# Patient Record
Sex: MALE | Race: WHITE
[De-identification: names, ages, dates, MRNs, and addresses within clinical notes are randomized per-mention and may not be internally consistent; named-entity substitution may affect disease eponyms.]

---

## 2017-02-27 PROBLEM — H40.1131 PRIMARY OPEN ANGLE GLAUCOMA OF BOTH EYES, MILD STAGE: Status: ACTIVE | Noted: 2017-02-27

## 2017-05-31 ENCOUNTER — PRIOR ORIGINAL RECORDS (OUTPATIENT)
Dept: OTHER | Age: 67
End: 2017-05-31

## 2017-05-31 ENCOUNTER — LAB ENCOUNTER (OUTPATIENT)
Dept: LAB | Facility: HOSPITAL | Age: 67
End: 2017-05-31
Attending: INTERNAL MEDICINE
Payer: MEDICARE

## 2017-05-31 DIAGNOSIS — E78.00 PURE HYPERCHOLESTEROLEMIA: Primary | ICD-10-CM

## 2017-05-31 PROCEDURE — 80061 LIPID PANEL: CPT

## 2017-05-31 PROCEDURE — 82550 ASSAY OF CK (CPK): CPT

## 2017-05-31 PROCEDURE — 84450 TRANSFERASE (AST) (SGOT): CPT

## 2017-05-31 PROCEDURE — 84460 ALANINE AMINO (ALT) (SGPT): CPT

## 2017-05-31 PROCEDURE — 36415 COLL VENOUS BLD VENIPUNCTURE: CPT

## 2017-06-05 RX ORDER — SULFASALAZINE 500 MG/1
TABLET ORAL
Qty: 180 TABLET | Refills: 1 | OUTPATIENT
Start: 2017-06-05

## 2017-06-05 NOTE — TELEPHONE ENCOUNTER
LOV:12-8  Last Filled:12-8, #180 with 1 refill  Labs:5-31, AST 45 and ALT 48  Future Appointments  Date Time Provider Johanny Pittman   8/28/2017 10:00 AM Navya Fay MD [address] [facility]       Please Advise

## 2017-06-08 ENCOUNTER — PRIOR ORIGINAL RECORDS (OUTPATIENT)
Dept: OTHER | Age: 67
End: 2017-06-08

## 2017-06-08 ENCOUNTER — LAB ENCOUNTER (OUTPATIENT)
Dept: LAB | Facility: HOSPITAL | Age: 67
End: 2017-06-08
Attending: INTERNAL MEDICINE
Payer: MEDICARE

## 2017-06-08 DIAGNOSIS — Z51.81 THERAPEUTIC DRUG MONITORING: ICD-10-CM

## 2017-06-08 DIAGNOSIS — M07.60 CROHN'S RELATED ARTHRITIS (HCC): ICD-10-CM

## 2017-06-08 DIAGNOSIS — K50.90 CROHN'S RELATED ARTHRITIS (HCC): ICD-10-CM

## 2017-06-08 PROCEDURE — 85652 RBC SED RATE AUTOMATED: CPT

## 2017-06-08 PROCEDURE — 84450 TRANSFERASE (AST) (SGOT): CPT

## 2017-06-08 PROCEDURE — 85025 COMPLETE CBC W/AUTO DIFF WBC: CPT

## 2017-06-08 PROCEDURE — 82565 ASSAY OF CREATININE: CPT

## 2017-06-08 PROCEDURE — 84460 ALANINE AMINO (ALT) (SGPT): CPT

## 2017-06-08 PROCEDURE — 36415 COLL VENOUS BLD VENIPUNCTURE: CPT

## 2017-06-08 PROCEDURE — 86140 C-REACTIVE PROTEIN: CPT

## 2017-06-12 ENCOUNTER — OFFICE VISIT (OUTPATIENT)
Dept: RHEUMATOLOGY | Facility: CLINIC | Age: 67
End: 2017-06-12

## 2017-06-12 VITALS
HEIGHT: 68 IN | SYSTOLIC BLOOD PRESSURE: 156 MMHG | DIASTOLIC BLOOD PRESSURE: 82 MMHG | HEART RATE: 87 BPM | BODY MASS INDEX: 36.98 KG/M2 | WEIGHT: 244 LBS

## 2017-06-12 DIAGNOSIS — Z51.81 THERAPEUTIC DRUG MONITORING: ICD-10-CM

## 2017-06-12 DIAGNOSIS — M07.60 ARTHROPATHY IN CROHN'S DISEASE (HCC): Primary | ICD-10-CM

## 2017-06-12 DIAGNOSIS — K50.90 ARTHROPATHY IN CROHN'S DISEASE (HCC): Primary | ICD-10-CM

## 2017-06-12 LAB
ALT (SGPT): 48 U/L
AST (SGOT): 45 U/L
CHOLESTEROL, TOTAL: 154 MG/DL
CREATININE KINASE: 93 U/L
CREATININE, SERUM: 0.97 MG/DL
ESR (SED RATE): 6 MM/HR
HDL CHOLESTEROL: 43 MG/DL
HEMATOCRIT: 41.8 %
HEMOGLOBIN: 14.2 G/DL
LDL CHOLESTEROL: 77 MG/DL
NON-HDL CHOLESTEROL: 111 MG/DL
PLATELETS: 206 K/UL
RED BLOOD COUNT: 4.52 X 10-6/U
SGOT (AST): 39 IU/L
SGPT (ALT): 38 IU/L
TRIGLYCERIDES: 171 MG/DL
WHITE BLOOD COUNT: 7.3 X 10-3/U

## 2017-06-12 PROCEDURE — G0463 HOSPITAL OUTPT CLINIC VISIT: HCPCS | Performed by: INTERNAL MEDICINE

## 2017-06-12 PROCEDURE — 99214 OFFICE O/P EST MOD 30 MIN: CPT | Performed by: INTERNAL MEDICINE

## 2017-06-12 NOTE — PROGRESS NOTES
Car Harris is a 74year old male. HPI:   Patient presents with:  Joint Pain      I had the pleasure of seeing your patient  Adrian Randolph  On 6/12/2017. I had last seen him on 12/7/2016. I had last seen him on 6/20/2016.  I had last seen him on Sept hands and right thumb. He has felt the wrist is bothering him. The back, the shoulders and knees are doing well. He has no issues with the humira. He gives himself the injections. He feels he still has pain.  He has no infections since starting the inejc chriopracter. The crohn's doing well. He has still been on ssz 500mg bid. No issues iwht fever or inllinesses. He's able to golf again. Started humira 40mg every other week on 5/2015. He's been doing really well now.      6/12/2017  He's been on Rfl: 0   latanoprost (XALATAN) 0.005 % Ophthalmic Solution Place 1 drop into both eyes nightly. Disp: 3 Bottle Rfl: 3   latanoprost (XALATAN) 0.005 % Ophthalmic Solution Place 1 drop into both eyes nightly.  Disp: 1 Bottle Rfl: 6   ASPIR-LOW 81 MG OR TBEC 1 Triglycerides      1-149 mg/dL  171 (H)   NON HDL CHOL      <130 mg/dL  111   LDL Cholesterol Calc      0-99 mg/dL  77   CREATININE      0.50-1.50 mg/dL 0.97    GFR, Non-      >=60 >60    GFR, -American      >=60 >60    AST (SGOT)

## 2017-06-12 NOTE — PATIENT INSTRUCTIONS
1. cont. Sulfasalazine 500mg twice a day   2. Check labs in 6 months   3.  Return to clinic in 6 months -

## 2017-06-13 ENCOUNTER — PRIOR ORIGINAL RECORDS (OUTPATIENT)
Dept: OTHER | Age: 67
End: 2017-06-13

## 2017-06-22 RX ORDER — SULFASALAZINE 500 MG/1
TABLET ORAL
Qty: 180 TABLET | Refills: 1 | Status: SHIPPED | OUTPATIENT
Start: 2017-06-22 | End: 2017-12-13

## 2017-06-22 NOTE — TELEPHONE ENCOUNTER
LOV:6-12  Last Filled:12-8,#180 with 1 refill  Patient phoned and needs a refill  Labs:   Future Appointments  Date Time Provider Johanny Toya   8/28/2017 10:00 AM MD Lashanda Ramsay   12/13/2017 10:00 AM José Miguel Martinez MD [facility]

## 2017-12-13 ENCOUNTER — OFFICE VISIT (OUTPATIENT)
Dept: RHEUMATOLOGY | Facility: CLINIC | Age: 67
End: 2017-12-13

## 2017-12-13 VITALS
WEIGHT: 246 LBS | SYSTOLIC BLOOD PRESSURE: 178 MMHG | HEIGHT: 68 IN | BODY MASS INDEX: 37.28 KG/M2 | HEART RATE: 87 BPM | DIASTOLIC BLOOD PRESSURE: 81 MMHG

## 2017-12-13 DIAGNOSIS — K50.90 CROHN'S RELATED ARTHRITIS (HCC): Primary | ICD-10-CM

## 2017-12-13 DIAGNOSIS — Z51.81 THERAPEUTIC DRUG MONITORING: ICD-10-CM

## 2017-12-13 DIAGNOSIS — M07.60 CROHN'S RELATED ARTHRITIS (HCC): Primary | ICD-10-CM

## 2017-12-13 PROCEDURE — 99214 OFFICE O/P EST MOD 30 MIN: CPT | Performed by: INTERNAL MEDICINE

## 2017-12-13 PROCEDURE — G0463 HOSPITAL OUTPT CLINIC VISIT: HCPCS | Performed by: INTERNAL MEDICINE

## 2017-12-13 RX ORDER — SULFASALAZINE 500 MG/1
TABLET ORAL
Qty: 180 TABLET | Refills: 1 | Status: SHIPPED | OUTPATIENT
Start: 2017-12-13 | End: 2018-05-30

## 2017-12-13 NOTE — PATIENT INSTRUCTIONS
1. cont. Sulfasalazine 500mg twice a day   2. Check labs in 6 months with dr. Kalen Mcdonald - get my labs done at the same time as his -   3. Return to clinic in 12 months -   Call if you need anything   4.  Add priobiotic -

## 2017-12-13 NOTE — PROGRESS NOTES
Angus Zavala is a 69year old male. HPI:   Patient presents with:  Joint Pain: left hip      I had the pleasure of seeing your patient  Rigoberto Huang  On 12/12/2017. I had last seen him on 6/12/2017. I had last seen him on 12/7/2016.  I had last seen fabrizio. He still has pain in his inkuckles in his hands and right thumb. He has felt the wrist is bothering him. The back, the shoulders and knees are doing well. He has no issues with the humira. He gives himself the injections.  He feels he still has pa exercisese to strengthen his back from his chriopracter. The crohn's doing well. He has still been on ssz 500mg bid. No issues iwht fever or inllinesses. He's able to golf again. Started humira 40mg every other week on 5/2015.    He's been doing r total) by mouth once daily. Disp: 90 tablet Rfl: 3   Irbesartan 300 MG Oral Tab Take 1 tablet (300 mg total) by mouth nightly. Disp: 90 tablet Rfl: 3   Olmesartan Medoxomil 40 MG Oral Tab daily.  Disp:  Rfl: 3   PANTOPRAZOLE SODIUM 40 MG Oral Tab EC TAKE 1 92.6    MCH      27.0-32.0 pg 31.6    MCHC      32.0-37.0 g/dl 34.1    RDW      11.0-15.0 % 12.7    Platelet Count      000-888 K/    MEAN PLATELET VOLUME      7.4-10.3 fL 10.0    Neutrophils %       52    Lymphocytes %       34    Monocytes % refill for 6 months, then if he's getting with lavonne - can refill before his next appt. - rtc in 12 months. Summary:  1. cont. Sulfasalazine 500mg twice a day   2.   Check labs in 6 months with dr. Carter Begin - get my labs done at the same time as his -

## 2018-05-07 PROBLEM — K50.113 CROHN'S COLITIS, WITH FISTULA (HCC): Status: ACTIVE | Noted: 2018-05-07

## 2018-05-07 PROBLEM — K60.3 ANAL FISTULA: Status: ACTIVE | Noted: 2018-05-07

## 2018-05-11 ENCOUNTER — PRIOR ORIGINAL RECORDS (OUTPATIENT)
Dept: OTHER | Age: 68
End: 2018-05-11

## 2018-05-11 PROCEDURE — 82043 UR ALBUMIN QUANTITATIVE: CPT | Performed by: INTERNAL MEDICINE

## 2018-05-11 PROCEDURE — 82570 ASSAY OF URINE CREATININE: CPT | Performed by: INTERNAL MEDICINE

## 2018-05-30 RX ORDER — SULFASALAZINE 500 MG/1
TABLET ORAL
Qty: 180 TABLET | Refills: 1 | Status: SHIPPED | OUTPATIENT
Start: 2018-05-30 | End: 2018-11-28

## 2018-05-30 NOTE — TELEPHONE ENCOUNTER
LOV:12-13  Last Filled:12-13, #180 with 1 refill  Labs: 5-11, AST 40 and ALT 38  Future Appointments  Date Time Provider Johanny Toya   10/12/2018 9:00 AM Ness Ryder MD [facility]   12/14/2018 10:00 AM Tavo Banda MD [facility]

## 2018-09-04 ENCOUNTER — MYAURORA ACCOUNT LINK (OUTPATIENT)
Dept: OTHER | Age: 68
End: 2018-09-04

## 2018-09-04 ENCOUNTER — PRIOR ORIGINAL RECORDS (OUTPATIENT)
Dept: OTHER | Age: 68
End: 2018-09-04

## 2018-09-04 LAB
ALBUMIN: 4.2 G/DL
ALKALINE PHOSPHATATE(ALK PHOS): 63 IU/L
ALT (SGPT): 38 U/L
AST (SGOT): 40 U/L
BILIRUBIN TOTAL: 0.54 MG/DL
BUN: 13 MG/DL
CALCIUM: 9.1 MG/DL
CHLORIDE: 102 MEQ/L
CHOLESTEROL, TOTAL: 156 MG/DL
CREATININE, SERUM: 0.84 MG/DL
GLOBULIN: 0.54 G/DL
GLUCOSE: 117 MG/DL
GLUCOSE: 117 MG/DL
HDL CHOLESTEROL: 50 MG/DL
HEMATOCRIT: 44.7 %
HEMOGLOBIN: 14.5 G/DL
LDL CHOLESTEROL: 77 MG/DL
PLATELETS: 218 K/UL
POTASSIUM, SERUM: 4.5 MEQ/L
PROTEIN, TOTAL: 7.3 G/DL
RED BLOOD COUNT: 4.75 X 10-6/U
SGOT (AST): 40 IU/L
SGPT (ALT): 38 IU/L
SODIUM: 140 MEQ/L
TRIGLYCERIDES: 145 MG/DL
WHITE BLOOD COUNT: 6.49 X 10-3/U

## 2018-09-12 PROCEDURE — 86706 HEP B SURFACE ANTIBODY: CPT | Performed by: INTERNAL MEDICINE

## 2018-09-12 PROCEDURE — 86803 HEPATITIS C AB TEST: CPT | Performed by: INTERNAL MEDICINE

## 2018-09-12 PROCEDURE — 86480 TB TEST CELL IMMUN MEASURE: CPT | Performed by: INTERNAL MEDICINE

## 2018-09-14 PROCEDURE — 88305 TISSUE EXAM BY PATHOLOGIST: CPT | Performed by: INTERNAL MEDICINE

## 2018-11-06 PROCEDURE — 88304 TISSUE EXAM BY PATHOLOGIST: CPT | Performed by: SURGERY

## 2018-11-28 ENCOUNTER — PRIOR ORIGINAL RECORDS (OUTPATIENT)
Dept: OTHER | Age: 68
End: 2018-11-28

## 2018-11-28 ENCOUNTER — LAB ENCOUNTER (OUTPATIENT)
Dept: LAB | Facility: HOSPITAL | Age: 68
End: 2018-11-28
Attending: INTERNAL MEDICINE
Payer: MEDICARE

## 2018-11-28 DIAGNOSIS — I25.10 CAD (CORONARY ARTERY DISEASE): Primary | ICD-10-CM

## 2018-11-28 DIAGNOSIS — Z51.81 THERAPEUTIC DRUG MONITORING: ICD-10-CM

## 2018-11-28 DIAGNOSIS — K50.90 CROHN'S RELATED ARTHRITIS (HCC): ICD-10-CM

## 2018-11-28 DIAGNOSIS — M07.60 CROHN'S RELATED ARTHRITIS (HCC): ICD-10-CM

## 2018-11-28 PROCEDURE — 36415 COLL VENOUS BLD VENIPUNCTURE: CPT

## 2018-11-28 PROCEDURE — 85027 COMPLETE CBC AUTOMATED: CPT

## 2018-11-28 PROCEDURE — 84460 ALANINE AMINO (ALT) (SGPT): CPT

## 2018-11-28 PROCEDURE — 85652 RBC SED RATE AUTOMATED: CPT

## 2018-11-28 PROCEDURE — 80061 LIPID PANEL: CPT

## 2018-11-28 PROCEDURE — 82550 ASSAY OF CK (CPK): CPT

## 2018-11-28 PROCEDURE — 82565 ASSAY OF CREATININE: CPT

## 2018-11-28 PROCEDURE — 84450 TRANSFERASE (AST) (SGOT): CPT

## 2018-11-28 PROCEDURE — 86140 C-REACTIVE PROTEIN: CPT

## 2018-11-28 RX ORDER — SULFASALAZINE 500 MG/1
TABLET ORAL
Qty: 180 TABLET | Refills: 0 | Status: SHIPPED | OUTPATIENT
Start: 2018-11-28 | End: 2018-12-10

## 2018-11-28 NOTE — TELEPHONE ENCOUNTER
LOV:12-13  Last Filled:SSZ 5-30, #180 with 1 refill  Labs:11-28, AST 35 and  ALT 31  Future Appointments   Date Time Provider Johanny Pittman   12/10/2018  1:30 PM Cookie Connelly MD [facility] [place]   12/10/2018  3:10 PM Cayetano Winchester MD [facility]

## 2018-11-29 LAB
ALT (SGPT): 31 U/L
AST (SGOT): 35 U/L
CHOLESTEROL, TOTAL: 128 MG/DL
CREATININE KINASE: 71 U/L
HDL CHOLESTEROL: 46 MG/DL
LDL CHOLESTEROL: 54 MG/DL
TRIGLYCERIDES: 139 MG/DL

## 2018-12-07 ENCOUNTER — PRIOR ORIGINAL RECORDS (OUTPATIENT)
Dept: OTHER | Age: 68
End: 2018-12-07

## 2018-12-10 ENCOUNTER — OFFICE VISIT (OUTPATIENT)
Dept: RHEUMATOLOGY | Facility: CLINIC | Age: 68
End: 2018-12-10
Payer: MEDICARE

## 2018-12-10 VITALS
HEART RATE: 86 BPM | DIASTOLIC BLOOD PRESSURE: 83 MMHG | HEIGHT: 70 IN | WEIGHT: 244 LBS | SYSTOLIC BLOOD PRESSURE: 154 MMHG | BODY MASS INDEX: 34.93 KG/M2

## 2018-12-10 DIAGNOSIS — M07.60 CROHN'S RELATED ARTHRITIS (HCC): Primary | ICD-10-CM

## 2018-12-10 DIAGNOSIS — K50.90 CROHN'S RELATED ARTHRITIS (HCC): Primary | ICD-10-CM

## 2018-12-10 DIAGNOSIS — M25.552 LEFT HIP PAIN: ICD-10-CM

## 2018-12-10 DIAGNOSIS — Z51.81 THERAPEUTIC DRUG MONITORING: ICD-10-CM

## 2018-12-10 PROCEDURE — 99214 OFFICE O/P EST MOD 30 MIN: CPT | Performed by: INTERNAL MEDICINE

## 2018-12-10 PROCEDURE — G0463 HOSPITAL OUTPT CLINIC VISIT: HCPCS | Performed by: INTERNAL MEDICINE

## 2018-12-10 RX ORDER — SULFASALAZINE 500 MG/1
1000 TABLET ORAL 2 TIMES DAILY
Qty: 360 TABLET | Refills: 0 | Status: SHIPPED | OUTPATIENT
Start: 2018-12-10 | End: 2018-12-12 | Stop reason: ALTCHOICE

## 2018-12-10 NOTE — PATIENT INSTRUCTIONS
1. cont. Sulfasalazine  - increase to 1000mg twice a day   2. Check labs in 3 months    3. Return to clinic in 3 months -   Call if you need anything   4. Add priobiotic -   5.  Check hip xrays

## 2018-12-10 NOTE — PROGRESS NOTES
Angus Zavala is a 77year old male. HPI:   Patient presents with:  Joint Pain  Hip Pain: left      I had the pleasure of seeing your patient  Rigoberto Huang  On 12/12/2017. I had last seen him on 6/12/2017. I had last seen him on 12/7/2016.  I had las is heled. He still has pain in his inkuckles in his hands and right thumb. He has felt the wrist is bothering him. The back, the shoulders and knees are doing well. He has no issues with the humira. He gives himself the injections.  He feels he still has doing exercisese to strengthen his back from his chriopracter. The crohn's doing well. He has still been on ssz 500mg bid. No issues iwht fever or inllinesses. He's able to golf again. Started humira 40mg every other week on 5/2015.    He's been d essential hypertension       Social Hx Reviewed   Family Hx Reviewed     Medications (Active prior to today's visit):    Current Outpatient Medications:  sulfaSALAzine 500 MG Oral Tab TAKE 1 TABLET BY MOUTH 2 TIMES DAILY WITH MEALS Disp: 180 tablet Rfl: 0 shoudler better, not tender, good rom    no tenderness in neck,   no si joint tenderess,   No fullness in left ankle   No  left heel tenderness. His left thumb is trigger finger present- tendon nodule felt.    No Right thumb trigger finger tendon nodule f related arthritis  - no flare of the crohn's  - d/w him about f/u with GI and probiotics -   - d/w him about diet changes  - he's not interested in taking more meds at this time   - he doesn't want a shot for his right trigger finger or voltaren gel 1%  -

## 2019-02-27 ENCOUNTER — APPOINTMENT (OUTPATIENT)
Dept: LAB | Facility: HOSPITAL | Age: 69
End: 2019-02-27
Attending: INTERNAL MEDICINE
Payer: MEDICARE

## 2019-02-27 DIAGNOSIS — K50.90 CROHN'S RELATED ARTHRITIS (HCC): ICD-10-CM

## 2019-02-27 DIAGNOSIS — M07.60 CROHN'S RELATED ARTHRITIS (HCC): ICD-10-CM

## 2019-02-27 DIAGNOSIS — Z51.81 THERAPEUTIC DRUG MONITORING: ICD-10-CM

## 2019-02-27 LAB
ALT SERPL-CCNC: 32 U/L (ref 16–61)
AST SERPL-CCNC: 33 U/L (ref 15–37)
CREAT BLD-MCNC: 1.13 MG/DL (ref 0.7–1.3)
CRP SERPL-MCNC: 0.64 MG/DL (ref ?–0.3)
DEPRECATED RDW RBC AUTO: 42.3 FL (ref 35.1–46.3)
ERYTHROCYTE [DISTWIDTH] IN BLOOD BY AUTOMATED COUNT: 12.3 % (ref 11–15)
ERYTHROCYTE [SEDIMENTATION RATE] IN BLOOD: 13 MM/HR (ref 0–20)
HCT VFR BLD AUTO: 43.5 % (ref 39–53)
HGB BLD-MCNC: 14.7 G/DL (ref 13–17.5)
MCH RBC QN AUTO: 31.7 PG (ref 26–34)
MCHC RBC AUTO-ENTMCNC: 33.8 G/DL (ref 31–37)
MCV RBC AUTO: 93.8 FL (ref 80–100)
PLATELET # BLD AUTO: 224 10(3)UL (ref 150–450)
RBC # BLD AUTO: 4.64 X10(6)UL (ref 3.8–5.8)
WBC # BLD AUTO: 6.5 X10(3) UL (ref 4–11)

## 2019-02-27 PROCEDURE — 82565 ASSAY OF CREATININE: CPT

## 2019-02-27 PROCEDURE — 86140 C-REACTIVE PROTEIN: CPT

## 2019-02-27 PROCEDURE — 85652 RBC SED RATE AUTOMATED: CPT

## 2019-02-27 PROCEDURE — 36415 COLL VENOUS BLD VENIPUNCTURE: CPT

## 2019-02-27 PROCEDURE — 85027 COMPLETE CBC AUTOMATED: CPT

## 2019-02-27 PROCEDURE — 84450 TRANSFERASE (AST) (SGOT): CPT

## 2019-02-27 PROCEDURE — 84460 ALANINE AMINO (ALT) (SGPT): CPT

## 2019-02-28 VITALS
HEIGHT: 70 IN | WEIGHT: 241 LBS | DIASTOLIC BLOOD PRESSURE: 70 MMHG | HEART RATE: 88 BPM | BODY MASS INDEX: 34.5 KG/M2 | SYSTOLIC BLOOD PRESSURE: 130 MMHG | OXYGEN SATURATION: 97 %

## 2019-03-01 ENCOUNTER — OFFICE VISIT (OUTPATIENT)
Dept: RHEUMATOLOGY | Facility: CLINIC | Age: 69
End: 2019-03-01
Payer: MEDICARE

## 2019-03-01 VITALS
SYSTOLIC BLOOD PRESSURE: 177 MMHG | BODY MASS INDEX: 34.36 KG/M2 | HEIGHT: 70 IN | WEIGHT: 240 LBS | HEART RATE: 91 BPM | DIASTOLIC BLOOD PRESSURE: 84 MMHG

## 2019-03-01 VITALS
HEART RATE: 76 BPM | WEIGHT: 246 LBS | DIASTOLIC BLOOD PRESSURE: 60 MMHG | OXYGEN SATURATION: 96 % | SYSTOLIC BLOOD PRESSURE: 142 MMHG | HEIGHT: 70 IN | RESPIRATION RATE: 18 BRPM | BODY MASS INDEX: 35.22 KG/M2

## 2019-03-01 DIAGNOSIS — K50.90 CROHN'S RELATED ARTHRITIS (HCC): Primary | ICD-10-CM

## 2019-03-01 DIAGNOSIS — Z51.81 THERAPEUTIC DRUG MONITORING: ICD-10-CM

## 2019-03-01 DIAGNOSIS — M07.60 CROHN'S RELATED ARTHRITIS (HCC): Primary | ICD-10-CM

## 2019-03-01 PROCEDURE — G0463 HOSPITAL OUTPT CLINIC VISIT: HCPCS | Performed by: INTERNAL MEDICINE

## 2019-03-01 PROCEDURE — 99214 OFFICE O/P EST MOD 30 MIN: CPT | Performed by: INTERNAL MEDICINE

## 2019-03-01 NOTE — PATIENT INSTRUCTIONS
1. cont. Sulfasalazine 1000mg twice a day   2. Check labs in 3 months    3. Return to clinic in 4 months -   Call if you need anything   4. Add priobiotic   5. F/u with dr. Sean Coombs and dr. Patrick Molina.

## 2019-03-01 NOTE — PROGRESS NOTES
Amando Suarez is a 77year old male. HPI:   Patient presents with:  Hip Pain: left hip  Follow - Up: Labs      I had the pleasure of seeing your patient  Beauty Kennel  On 3/1/2019. I had last seen him on 12/10/2018 and  12/12/2017.   I had last seen h in remission. He feels good with that. He feels everything is heled. He still has pain in his inkuckles in his hands and right thumb. He has felt the wrist is bothering him. The back, the shoulders and knees are doing well.   He has no issues with the hum is better. It was better when he started ssz. He's also doing exercisese to strengthen his back from his chriopracter. The crohn's doing well. He has still been on ssz 500mg bid. No issues iwht fever or inllinesses. He's able to golf again.    Start lot of back pain. It was above the waist line on both sides. He saw Dr. Abimbola Colmenares - who referred to Dr. Melissa Clark - physiatry. He was then sent to physical therapy. X 4 weeks -   He has one more session to go. He also had back and bilat hip xrays.    The back PHYSICAL EXAM:   HEENT: Clear oropharynx, no oral ulcers, EOM intact, clear sclear, PERRLA, pleasant, no acute distress, no CAD, no neck tendnerness, good ROM,   No rashes  CVS: RRR, no murmurs  RS: CTAB, no crackles, no rhonchi  ABD: Soft Non tender greater trochanter, indicative of either DISH  or chronic inflammatory enthesopathy of the gluteus tendinous complex. ASSESSMENT/PLAN:     1.  Crohn's related arthriit s- Sacroillitis and  Polyarthralgia, elevated esr, related to crohn's  inflammatory evelyn

## 2019-04-02 RX ORDER — SULFASALAZINE 500 MG/1
TABLET ORAL
Qty: 360 TABLET | Refills: 1 | Status: SHIPPED | OUTPATIENT
Start: 2019-04-02 | End: 2019-09-06 | Stop reason: ALTCHOICE

## 2019-04-02 NOTE — TELEPHONE ENCOUNTER
LOV:3-1  Last Filled:12-10, #360 with 0 refill  Labs:2-27, ALT 32 and AST 33  Future Appointments   Date Time Provider Johanny Toya   4/4/2019 10:50 AM Donaldo Samuel MD [facility]   4/16/2019  1:30 PM MD [facility]   4/30/2019  1:30 PM MD [facility]   5/14/2019 10:30 AM MD [facility]   5/17/2019 10:45 AM Ana Paula Yuan MD [facility]   7/1/2019 10:00 AM Lillian Robbins MD [address] [facility]       Please Advise

## 2019-04-12 RX ORDER — SULFASALAZINE 500 MG/1
TABLET ORAL
COMMUNITY
Start: 2017-06-13 | End: 2020-06-02

## 2019-04-12 RX ORDER — IRBESARTAN 300 MG/1
TABLET ORAL
COMMUNITY
Start: 2017-06-13

## 2019-04-12 RX ORDER — ATORVASTATIN CALCIUM 40 MG/1
TABLET, FILM COATED ORAL
COMMUNITY
Start: 2019-01-07 | End: 2019-09-22 | Stop reason: SDUPTHER

## 2019-04-12 RX ORDER — FENOFIBRATE 145 MG/1
TABLET, COATED ORAL
COMMUNITY
Start: 2013-09-11

## 2019-04-12 RX ORDER — AMLODIPINE BESYLATE 5 MG/1
TABLET ORAL
COMMUNITY
Start: 2017-06-13

## 2019-07-01 ENCOUNTER — TELEPHONE (OUTPATIENT)
Dept: RHEUMATOLOGY | Facility: CLINIC | Age: 69
End: 2019-07-01

## 2019-07-01 ENCOUNTER — LAB ENCOUNTER (OUTPATIENT)
Dept: LAB | Facility: HOSPITAL | Age: 69
End: 2019-07-01
Attending: INTERNAL MEDICINE
Payer: MEDICARE

## 2019-07-01 DIAGNOSIS — K50.919 CROHN'S DISEASE WITH COMPLICATION, UNSPECIFIED GASTROINTESTINAL TRACT LOCATION (HCC): ICD-10-CM

## 2019-07-01 DIAGNOSIS — M07.60 CROHN'S RELATED ARTHRITIS (HCC): ICD-10-CM

## 2019-07-01 DIAGNOSIS — K50.90 CROHN'S RELATED ARTHRITIS (HCC): Primary | ICD-10-CM

## 2019-07-01 DIAGNOSIS — Z51.81 ENCOUNTER FOR THERAPEUTIC DRUG MONITORING: ICD-10-CM

## 2019-07-01 DIAGNOSIS — K50.90 CROHN'S RELATED ARTHRITIS (HCC): ICD-10-CM

## 2019-07-01 DIAGNOSIS — M07.60 CROHN'S RELATED ARTHRITIS (HCC): Primary | ICD-10-CM

## 2019-07-01 LAB
ALBUMIN SERPL-MCNC: 4.2 G/DL (ref 3.4–5)
ALT SERPL-CCNC: 34 U/L (ref 16–61)
AST SERPL-CCNC: 30 U/L (ref 15–37)
BASOPHILS # BLD AUTO: 0.09 X10(3) UL (ref 0–0.2)
BASOPHILS NFR BLD AUTO: 1.4 %
CREAT BLD-MCNC: 0.96 MG/DL (ref 0.7–1.3)
CRP SERPL-MCNC: 0.92 MG/DL (ref ?–0.3)
DEPRECATED RDW RBC AUTO: 42 FL (ref 35.1–46.3)
EOSINOPHIL # BLD AUTO: 0.25 X10(3) UL (ref 0–0.7)
EOSINOPHIL NFR BLD AUTO: 3.8 %
ERYTHROCYTE [DISTWIDTH] IN BLOOD BY AUTOMATED COUNT: 12.1 % (ref 11–15)
ERYTHROCYTE [SEDIMENTATION RATE] IN BLOOD: 11 MM/HR (ref 0–20)
HCT VFR BLD AUTO: 43.3 % (ref 39–53)
HGB BLD-MCNC: 14.6 G/DL (ref 13–17.5)
IMM GRANULOCYTES # BLD AUTO: 0.03 X10(3) UL (ref 0–1)
IMM GRANULOCYTES NFR BLD: 0.5 %
LYMPHOCYTES # BLD AUTO: 1.66 X10(3) UL (ref 1–4)
LYMPHOCYTES NFR BLD AUTO: 25.2 %
MCH RBC QN AUTO: 31.7 PG (ref 26–34)
MCHC RBC AUTO-ENTMCNC: 33.7 G/DL (ref 31–37)
MCV RBC AUTO: 94.1 FL (ref 80–100)
MONOCYTES # BLD AUTO: 0.69 X10(3) UL (ref 0.1–1)
MONOCYTES NFR BLD AUTO: 10.5 %
NEUTROPHILS # BLD AUTO: 3.88 X10 (3) UL (ref 1.5–7.7)
NEUTROPHILS # BLD AUTO: 3.88 X10(3) UL (ref 1.5–7.7)
NEUTROPHILS NFR BLD AUTO: 58.6 %
PLATELET # BLD AUTO: 229 10(3)UL (ref 150–450)
RBC # BLD AUTO: 4.6 X10(6)UL (ref 3.8–5.8)
WBC # BLD AUTO: 6.6 X10(3) UL (ref 4–11)

## 2019-07-01 PROCEDURE — 36415 COLL VENOUS BLD VENIPUNCTURE: CPT

## 2019-07-01 PROCEDURE — 86140 C-REACTIVE PROTEIN: CPT

## 2019-07-01 PROCEDURE — 82565 ASSAY OF CREATININE: CPT

## 2019-07-01 PROCEDURE — 85652 RBC SED RATE AUTOMATED: CPT

## 2019-07-01 PROCEDURE — 84450 TRANSFERASE (AST) (SGOT): CPT

## 2019-07-01 PROCEDURE — 82040 ASSAY OF SERUM ALBUMIN: CPT

## 2019-07-01 PROCEDURE — 84460 ALANINE AMINO (ALT) (SGPT): CPT

## 2019-07-01 PROCEDURE — 85025 COMPLETE CBC W/AUTO DIFF WBC: CPT

## 2019-07-23 ENCOUNTER — OFFICE VISIT (OUTPATIENT)
Dept: RHEUMATOLOGY | Facility: CLINIC | Age: 69
End: 2019-07-23
Payer: MEDICARE

## 2019-07-23 VITALS
WEIGHT: 244 LBS | DIASTOLIC BLOOD PRESSURE: 77 MMHG | SYSTOLIC BLOOD PRESSURE: 153 MMHG | HEART RATE: 91 BPM | HEIGHT: 71 IN | BODY MASS INDEX: 34.16 KG/M2

## 2019-07-23 DIAGNOSIS — M07.60 CROHN'S RELATED ARTHRITIS (HCC): Primary | ICD-10-CM

## 2019-07-23 DIAGNOSIS — Z51.81 THERAPEUTIC DRUG MONITORING: ICD-10-CM

## 2019-07-23 DIAGNOSIS — K50.90 CROHN'S RELATED ARTHRITIS (HCC): Primary | ICD-10-CM

## 2019-07-23 DIAGNOSIS — M25.552 LEFT HIP PAIN: ICD-10-CM

## 2019-07-23 PROCEDURE — 99214 OFFICE O/P EST MOD 30 MIN: CPT | Performed by: INTERNAL MEDICINE

## 2019-07-23 PROCEDURE — G0463 HOSPITAL OUTPT CLINIC VISIT: HCPCS | Performed by: INTERNAL MEDICINE

## 2019-07-23 RX ORDER — PREDNISONE 10 MG/1
TABLET ORAL
Qty: 21 TABLET | Refills: 0 | Status: SHIPPED | OUTPATIENT
Start: 2019-07-23 | End: 2020-05-26

## 2019-07-23 NOTE — PROGRESS NOTES
Dulce Skelton is a 77year old male. HPI:   Patient presents with: Follow - Up  Hip Pain: Left hip  Back Pain: lower back      I had the pleasure of seeing your patient  Ann Santiago  On 7/23/2019. Dottie Pires He is a 77year old who has hx of Crohn's.   He h [facility] - physiatry. He was then sent to physical therapy. X 4 weeks -   He has one more session to go. He also had back and bilat hip xrays. The back is better and the hips are a little better. He's not in a lot of pain.    He has more soreness with TABLETS BY MOUTH TWICE DAILY WITH MEALS Disp: 360 tablet Rfl: 1   Sildenafil Citrate 100 MG Oral Tab Take 1 tablet (100 mg total) by mouth daily as needed for Erectile Dysfunction.  Disp: 8 tablet Rfl: 3   Pantoprazole Sodium 40 MG Oral Tab EC TAKE 1 TABLET Hematocrit      39.0 - 53.0 % 43.3   MCV      80.0 - 100.0 fL 94.1   MCH      26.0 - 34.0 pg 31.7   MCHC      31.0 - 37.0 g/dL 33.7   RDW-SD      35.1 - 46.3 fL 42.0   RDW      11.0 - 15.0 % 12.1   Platelet Count      278.8 - 450.0 10(3)uL 229.0   Prelim ginny constantino. - now on ssz 1000mg bid - not much improvement in fistula or change in joint pain - so will ssee mri lef thip results. He will f/u with  [facility] for anal fistula   - d/w him that ok if it's recommeneded to switch therapy to he

## 2019-07-23 NOTE — PATIENT INSTRUCTIONS
1. Check left hip MRI   2. Trial of prednisone 10mg - Take 6 tabsx 1 day, take 5 tabsx 1 day, take 4 tabsx 1 day,take 3 tabsx 1 day, take 2 tabsx 1 day, take 1 tabsx 1 day, then off  3.  Will call with results - if active inflamamtion - will increase  Sulfa

## 2019-08-01 ENCOUNTER — HOSPITAL ENCOUNTER (OUTPATIENT)
Dept: MRI IMAGING | Facility: HOSPITAL | Age: 69
Discharge: HOME | End: 2019-08-01
Attending: INTERNAL MEDICINE
Payer: MEDICARE

## 2019-08-01 DIAGNOSIS — K50.90 CROHN'S RELATED ARTHRITIS (HCC): ICD-10-CM

## 2019-08-01 DIAGNOSIS — M25.552 LEFT HIP PAIN: ICD-10-CM

## 2019-08-01 DIAGNOSIS — M07.60 CROHN'S RELATED ARTHRITIS (HCC): ICD-10-CM

## 2019-08-01 PROCEDURE — 73723 MRI JOINT LWR EXTR W/O&W/DYE: CPT | Performed by: INTERNAL MEDICINE

## 2019-08-01 PROCEDURE — A9575 INJ GADOTERATE MEGLUMI 0.1ML: HCPCS | Performed by: INTERNAL MEDICINE

## 2019-08-15 DIAGNOSIS — I25.10 ATHEROSCLEROSIS OF NATIVE CORONARY ARTERY OF NATIVE HEART WITHOUT ANGINA PECTORIS: Primary | ICD-10-CM

## 2019-08-15 DIAGNOSIS — E78.5 HYPERLIPIDEMIA, UNSPECIFIED HYPERLIPIDEMIA TYPE: ICD-10-CM

## 2019-08-15 DIAGNOSIS — I10 HYPERTENSION, UNSPECIFIED TYPE: ICD-10-CM

## 2019-09-03 ENCOUNTER — ANCILLARY PROCEDURE (OUTPATIENT)
Dept: CARDIOLOGY | Age: 69
End: 2019-09-03
Attending: INTERNAL MEDICINE

## 2019-09-03 VITALS — BODY MASS INDEX: 34.5 KG/M2 | WEIGHT: 241 LBS | HEIGHT: 70 IN

## 2019-09-03 DIAGNOSIS — I10 HYPERTENSION, UNSPECIFIED TYPE: ICD-10-CM

## 2019-09-03 DIAGNOSIS — E78.5 HYPERLIPIDEMIA, UNSPECIFIED HYPERLIPIDEMIA TYPE: ICD-10-CM

## 2019-09-03 DIAGNOSIS — I25.10 ATHEROSCLEROSIS OF NATIVE CORONARY ARTERY OF NATIVE HEART WITHOUT ANGINA PECTORIS: ICD-10-CM

## 2019-09-03 LAB
LV EF: 67 %
STRESS POST EXERCISE DUR MIN: 1 MIN
STRESS POST EXERCISE DUR SEC: 1 SEC
STRESS TARGET HR: 150 BPM

## 2019-09-03 PROCEDURE — A9502 TC99M TETROFOSMIN: HCPCS | Performed by: INTERNAL MEDICINE

## 2019-09-03 PROCEDURE — 93017 CV STRESS TEST TRACING ONLY: CPT | Performed by: INTERNAL MEDICINE

## 2019-09-03 PROCEDURE — 93018 CV STRESS TEST I&R ONLY: CPT | Performed by: INTERNAL MEDICINE

## 2019-09-03 PROCEDURE — 93016 CV STRESS TEST SUPVJ ONLY: CPT | Performed by: INTERNAL MEDICINE

## 2019-09-03 PROCEDURE — 78452 HT MUSCLE IMAGE SPECT MULT: CPT | Performed by: INTERNAL MEDICINE

## 2019-09-03 RX ORDER — REGADENOSON 0.08 MG/ML
0.4 INJECTION, SOLUTION INTRAVENOUS ONCE
Status: COMPLETED | OUTPATIENT
Start: 2019-09-03 | End: 2019-09-03

## 2019-09-03 RX ADMIN — REGADENOSON 0.4 MG: 0.08 INJECTION, SOLUTION INTRAVENOUS at 09:10

## 2019-09-03 ASSESSMENT — EXERCISE STRESS TEST
PEAK_RPP: 8232
PEAK_BP: 112/60
COMMENTS: 8 MINUTE RECOVERY
PEAK_HR: 87
PEAK_BP: 128/64
PEAK_RPP: 11620
PEAK_RPP: 11960
STAGE_CATEGORIES: RESTING
STOPPAGE_REASON: PROTOCOL COMPLETE
STRESS_SYMPTOMS: LIGHTHEADEDNESS
STAGE_CATEGORIES: 1
PEAK_BP: 102/60
PEAK_HR: 92
PEAK_HR: 83
PEAK_RPP: 9184
PEAK_RPP: 8874
STAGE_CATEGORIES: RECOVERY 3
STRESS_SYMPTOMS: LIGHTHEADEDNESS
PEAK_HR: 85
PEAK_BP: 130/64
STAGE_CATEGORIES: RECOVERY 0
PEAK_HR: 84
COMMENTS: 30 SECONDS RECOVERY
STRESS_SYMPTOMS: LIGHTHEADEDNESS
PEAK_RPP: 10880
STAGE_CATEGORIES: RECOVERY 4
COMMENTS: 2 MINUTE RECOVERY
STAGE_CATEGORIES: RECOVERY 1
PEAK_RPP: 10200
PEAK_HR: 85
PEAK_BP: 98/60
PEAK_BP: 120/60
COMMENTS: 4 MINUTE RECOVERY; SYMPTOMS RESOLVING
PEAK_BP: 140/74
PEAK_HR: 82
STAGE_CATEGORIES: RECOVERY 2

## 2019-09-05 ENCOUNTER — LAB ENCOUNTER (OUTPATIENT)
Dept: LAB | Facility: HOSPITAL | Age: 69
End: 2019-09-05
Attending: INTERNAL MEDICINE
Payer: MEDICARE

## 2019-09-05 DIAGNOSIS — E78.00 PURE HYPERCHOLESTEROLEMIA: Primary | ICD-10-CM

## 2019-09-05 DIAGNOSIS — E11.9 TYPE 2 DIABETES MELLITUS WITHOUT COMPLICATION, WITHOUT LONG-TERM CURRENT USE OF INSULIN (HCC): ICD-10-CM

## 2019-09-05 LAB
ALT SERPL-CCNC: 34 U/L
ALT SERPL-CCNC: 34 U/L (ref 16–61)
ANION GAP SERPL CALC-SCNC: 7 MMOL/L
ANION GAP SERPL CALC-SCNC: 7 MMOL/L (ref 0–18)
AST SERPL-CCNC: 34 U/L
AST SERPL-CCNC: 34 U/L (ref 15–37)
BUN BLD-MCNC: 12 MG/DL (ref 7–18)
BUN SERPL-MCNC: 12 MG/DL
BUN/CREAT SERPL: 12.8
BUN/CREAT SERPL: 12.8 (ref 10–20)
CALCIUM BLD-MCNC: 9.2 MG/DL (ref 8.5–10.1)
CALCIUM SERPL-MCNC: 9.2 MG/DL
CHLORIDE SERPL-SCNC: 107 MMOL/L
CHLORIDE SERPL-SCNC: 107 MMOL/L (ref 98–112)
CHOLEST SERPL-MCNC: 148 MG/DL
CHOLEST SMN-MCNC: 148 MG/DL (ref ?–200)
CHOLEST/HDLC SERPL: NORMAL {RATIO}
CK SERPL-CCNC: 94 U/L
CK SERPL-CCNC: 94 U/L (ref 39–308)
CO2 SERPL-SCNC: 26 MMOL/L
CO2 SERPL-SCNC: 26 MMOL/L (ref 21–32)
CREAT BLD-MCNC: 0.94 MG/DL (ref 0.7–1.3)
CREAT SERPL-MCNC: 0.94 MG/DL
CREAT UR-SCNC: 120 MG/DL
EST. AVERAGE GLUCOSE BLD GHB EST-MCNC: 114 MG/DL (ref 68–126)
GLUCOSE BLD-MCNC: 124 MG/DL (ref 70–99)
GLUCOSE SERPL-MCNC: 124 MG/DL
HBA1C MFR BLD HPLC: 5.6 % (ref ?–5.7)
HDLC SERPL-MCNC: 38 MG/DL
HDLC SERPL-MCNC: 38 MG/DL (ref 40–59)
LDLC SERPL CALC-MCNC: 68 MG/DL
LDLC SERPL CALC-MCNC: 68 MG/DL (ref ?–100)
LENGTH OF FAST TIME PATIENT: NORMAL H
MICROALBUMIN UR-MCNC: 2.33 MG/DL
MICROALBUMIN/CREAT 24H UR-RTO: 19.4 UG/MG (ref ?–30)
NONHDLC SERPL-MCNC: 110 MG/DL (ref ?–130)
NONHDLC SERPL-MCNC: NORMAL MG/DL
OSMOLALITY SERPL CALC.SUM OF ELEC: 291 MOSM/KG (ref 275–295)
PATIENT FASTING: YES
PATIENT FASTING: YES
POTASSIUM SERPL-SCNC: 3.7 MMOL/L
POTASSIUM SERPL-SCNC: 3.7 MMOL/L (ref 3.5–5.1)
SODIUM SERPL-SCNC: 140 MMOL/L
SODIUM SERPL-SCNC: 140 MMOL/L (ref 136–145)
TRIGL SERPL-MCNC: 211 MG/DL
TRIGL SERPL-MCNC: 211 MG/DL (ref 30–149)
TSI SER-ACNC: 1.54 MIU/ML (ref 0.36–3.74)
VLDLC SERPL CALC-MCNC: 42 MG/DL (ref 0–30)
VLDLC SERPL CALC-MCNC: NORMAL MG/DL

## 2019-09-05 PROCEDURE — 82550 ASSAY OF CK (CPK): CPT

## 2019-09-05 PROCEDURE — 82570 ASSAY OF URINE CREATININE: CPT

## 2019-09-05 PROCEDURE — 84443 ASSAY THYROID STIM HORMONE: CPT

## 2019-09-05 PROCEDURE — 82043 UR ALBUMIN QUANTITATIVE: CPT

## 2019-09-05 PROCEDURE — 36415 COLL VENOUS BLD VENIPUNCTURE: CPT

## 2019-09-05 PROCEDURE — 83036 HEMOGLOBIN GLYCOSYLATED A1C: CPT

## 2019-09-05 PROCEDURE — 80061 LIPID PANEL: CPT

## 2019-09-05 PROCEDURE — 84450 TRANSFERASE (AST) (SGOT): CPT

## 2019-09-05 PROCEDURE — 80048 BASIC METABOLIC PNL TOTAL CA: CPT

## 2019-09-05 PROCEDURE — 84460 ALANINE AMINO (ALT) (SGPT): CPT

## 2019-09-06 ENCOUNTER — CLINICAL ABSTRACT (OUTPATIENT)
Dept: CARDIOLOGY | Age: 69
End: 2019-09-06

## 2019-09-09 ENCOUNTER — DOCUMENTATION (OUTPATIENT)
Dept: CARDIOLOGY | Age: 69
End: 2019-09-09

## 2019-09-09 PROBLEM — E11.9 TYPE 2 DIABETES MELLITUS (CMD): Status: ACTIVE | Noted: 2019-09-09

## 2019-09-09 PROBLEM — E78.49 OTHER HYPERLIPIDEMIA: Status: ACTIVE | Noted: 2019-09-09

## 2019-09-09 PROBLEM — I25.119 ATHEROSCLEROSIS OF CORONARY ARTERY OF NATIVE HEART WITH ANGINA PECTORIS (CMD): Status: ACTIVE | Noted: 2019-09-09

## 2019-09-09 RX ORDER — MAGNESIUM HYDROXIDE 1200 MG/15ML
LIQUID ORAL
COMMUNITY
Start: 2019-08-20 | End: 2020-06-02

## 2019-09-09 RX ORDER — SILDENAFIL 100 MG/1
100 TABLET, FILM COATED ORAL
COMMUNITY
Start: 2019-03-04

## 2019-09-09 RX ORDER — ACETAMINOPHEN 500 MG
1000 TABLET ORAL
COMMUNITY
End: 2020-06-02

## 2019-09-09 RX ORDER — LATANOPROST 50 UG/ML
1 SOLUTION/ DROPS OPHTHALMIC
COMMUNITY
Start: 2019-05-17

## 2019-09-09 RX ORDER — FELODIPINE 5 MG/1
5 TABLET, EXTENDED RELEASE ORAL DAILY
COMMUNITY
End: 2020-06-02 | Stop reason: CLARIF

## 2019-09-09 RX ORDER — OLMESARTAN MEDOXOMIL 40 MG/1
40 TABLET ORAL
COMMUNITY
End: 2020-06-02

## 2019-09-09 RX ORDER — PREDNISONE 10 MG/1
TABLET ORAL
COMMUNITY
Start: 2019-07-23 | End: 2020-06-02

## 2019-09-09 RX ORDER — ASPIRIN 100 %
POWDER (GRAM) MISCELLANEOUS
COMMUNITY
Start: 2009-07-14 | End: 2020-06-02

## 2019-09-09 RX ORDER — PANTOPRAZOLE SODIUM 40 MG/1
TABLET, DELAYED RELEASE ORAL
COMMUNITY
Start: 2015-06-16

## 2019-09-10 ENCOUNTER — OFFICE VISIT (OUTPATIENT)
Dept: CARDIOLOGY | Age: 69
End: 2019-09-10

## 2019-09-10 VITALS
HEART RATE: 87 BPM | DIASTOLIC BLOOD PRESSURE: 70 MMHG | HEIGHT: 70 IN | BODY MASS INDEX: 34.22 KG/M2 | SYSTOLIC BLOOD PRESSURE: 150 MMHG | OXYGEN SATURATION: 97 % | WEIGHT: 239 LBS

## 2019-09-10 DIAGNOSIS — E78.49 OTHER HYPERLIPIDEMIA: ICD-10-CM

## 2019-09-10 DIAGNOSIS — I25.10 CORONARY ARTERY DISEASE INVOLVING NATIVE CORONARY ARTERY OF NATIVE HEART WITHOUT ANGINA PECTORIS: Primary | ICD-10-CM

## 2019-09-10 PROCEDURE — 99214 OFFICE O/P EST MOD 30 MIN: CPT | Performed by: INTERNAL MEDICINE

## 2019-09-10 RX ORDER — HYDROCODONE BITARTRATE AND ACETAMINOPHEN 5; 325 MG/1; MG/1
TABLET ORAL
Refills: 0 | COMMUNITY
Start: 2019-08-19 | End: 2020-06-02

## 2019-09-24 RX ORDER — ATORVASTATIN CALCIUM 40 MG/1
TABLET, FILM COATED ORAL
Qty: 90 TABLET | Refills: 0 | Status: SHIPPED | OUTPATIENT
Start: 2019-09-24 | End: 2020-03-26

## 2019-11-21 PROBLEM — M16.0 PRIMARY OSTEOARTHRITIS OF BOTH HIPS: Status: ACTIVE | Noted: 2019-11-21

## 2020-03-26 RX ORDER — ATORVASTATIN CALCIUM 40 MG/1
TABLET, FILM COATED ORAL
Qty: 90 TABLET | Refills: 1 | Status: SHIPPED | OUTPATIENT
Start: 2020-03-26 | End: 2020-10-09 | Stop reason: SDUPTHER

## 2020-05-26 PROBLEM — K60.3 ANAL FISTULA: Status: RESOLVED | Noted: 2018-05-07 | Resolved: 2020-05-26

## 2020-05-26 LAB
ALBUMIN SERPL-MCNC: 4.8 G/DL
ALP SERPL-CCNC: 64 U/L
ALT SERPL-CCNC: 32 U/L
AST SERPL-CCNC: 33 U/L
BILIRUB SERPL-MCNC: 0.57 MG/DL
BUN/CREAT SERPL: 17
CALCIUM SERPL-MCNC: 10.2 MG/DL
CHLORIDE SERPL-SCNC: 102 MMOL/L
CHOLEST SERPL-MCNC: 119 MG/DL
CHOLEST/HDLC SERPL: 3 {RATIO}
CO2 SERPL-SCNC: 21 MMOL/L
CREAT SERPL-MCNC: 0.93 MG/DL
ESTIMATED AVERAGE GLUCOSE: 143
GLUCOSE SERPL-MCNC: 173 MG/DL
HBA1C MFR BLD: 6.6 %
HCT VFR BLD CALC: 44.7 %
HDLC SERPL-MCNC: 38 MG/DL
HGB BLD-MCNC: 14.6 G/DL
LDLC SERPL CALC-MCNC: 38 MG/DL
MCH RBC QN AUTO: 30.2 PG
MCHC RBC AUTO-ENTMCNC: 32.7 G/DL
MCV RBC AUTO: 92.5 FL
PLATELET # BLD: 260 10*3/UL
POTASSIUM SERPL-SCNC: 4.52 MMOL/L
PROT SERPL-MCNC: 7.9 G/DL
RBC # BLD: 4.83 10*6/UL
SODIUM SERPL-SCNC: 138 MMOL/L
TRIGL SERPL-MCNC: 215 MG/DL
VLDLC SERPL CALC-MCNC: 43 MG/DL
WBC # BLD: 7.5 10*3/UL

## 2020-05-26 NOTE — H&P (VIEW-ONLY)
Liza Sanchez, [address] documenting as Verbally dictated by Dr Lillian Britt is a 68year old male who presents for a pre-operative physical exam. Patient is to have [address], to be done by Dr. Jaycee Jones at [facility]      Family History   Problem Relation Age of Onset   • Heart Disease Father    • Heart Disease Mother    • Breast Cancer Mother    • Diabetes Maternal Grandmother       Social History: Occ: . : . Children: . Smoking: . EtOH: .   Exercise ELECTROCARDIOGRAM, COMPLETE  -     OFFICE/OUTPT VISIT,EST,LEVL IV    Crohn's colitis, with fistula (HCC)  ASSESSMENT: IMPROVED  PLAN: GASTRO    Type 2 diabetes mellitus without complication, without long-term current use of insulin (HCC)  ASSESSMENT: A

## 2020-05-29 ENCOUNTER — TELEPHONE (OUTPATIENT)
Dept: CARDIOLOGY | Age: 70
End: 2020-05-29

## 2020-06-01 ENCOUNTER — APPOINTMENT (OUTPATIENT)
Dept: CARDIOLOGY | Age: 70
End: 2020-06-01

## 2020-06-02 ENCOUNTER — OFFICE VISIT (OUTPATIENT)
Dept: CARDIOLOGY | Age: 70
End: 2020-06-02

## 2020-06-02 VITALS
BODY MASS INDEX: 35.5 KG/M2 | HEART RATE: 79 BPM | HEIGHT: 70 IN | DIASTOLIC BLOOD PRESSURE: 66 MMHG | OXYGEN SATURATION: 100 % | WEIGHT: 248 LBS | SYSTOLIC BLOOD PRESSURE: 136 MMHG

## 2020-06-02 DIAGNOSIS — I10 ESSENTIAL HYPERTENSION: ICD-10-CM

## 2020-06-02 DIAGNOSIS — Z01.810 PREOP CARDIOVASCULAR EXAM: Primary | ICD-10-CM

## 2020-06-02 DIAGNOSIS — I25.10 CORONARY ARTERY DISEASE INVOLVING NATIVE CORONARY ARTERY OF NATIVE HEART WITHOUT ANGINA PECTORIS: ICD-10-CM

## 2020-06-02 PROCEDURE — 99214 OFFICE O/P EST MOD 30 MIN: CPT | Performed by: NURSE PRACTITIONER

## 2020-06-02 SDOH — HEALTH STABILITY: MENTAL HEALTH: HOW OFTEN DO YOU HAVE A DRINK CONTAINING ALCOHOL?: MONTHLY OR LESS

## 2020-06-02 ASSESSMENT — PATIENT HEALTH QUESTIONNAIRE - PHQ9
SUM OF ALL RESPONSES TO PHQ9 QUESTIONS 1 AND 2: 0
2. FEELING DOWN, DEPRESSED OR HOPELESS: NOT AT ALL
1. LITTLE INTEREST OR PLEASURE IN DOING THINGS: NOT AT ALL
CLINICAL INTERPRETATION OF PHQ9 SCORE: NO FURTHER SCREENING NEEDED
SUM OF ALL RESPONSES TO PHQ9 QUESTIONS 1 AND 2: 0
CLINICAL INTERPRETATION OF PHQ2 SCORE: NO FURTHER SCREENING NEEDED

## 2020-06-09 ENCOUNTER — LAB ENCOUNTER (OUTPATIENT)
Dept: LAB | Facility: HOSPITAL | Age: 70
End: 2020-06-09
Attending: ORTHOPAEDIC SURGERY
Payer: MEDICARE

## 2020-06-09 DIAGNOSIS — Z01.818 PREOP TESTING: ICD-10-CM

## 2020-06-09 PROCEDURE — 86850 RBC ANTIBODY SCREEN: CPT

## 2020-06-09 PROCEDURE — 36415 COLL VENOUS BLD VENIPUNCTURE: CPT

## 2020-06-09 PROCEDURE — 86901 BLOOD TYPING SEROLOGIC RH(D): CPT

## 2020-06-09 PROCEDURE — 86900 BLOOD TYPING SEROLOGIC ABO: CPT

## 2020-06-09 PROCEDURE — 87641 MR-STAPH DNA AMP PROBE: CPT

## 2020-06-09 RX ORDER — CELECOXIB 200 MG/1
400 CAPSULE ORAL ONCE
Status: CANCELLED | OUTPATIENT
Start: 2020-06-10

## 2020-06-10 ENCOUNTER — HOSPITAL ENCOUNTER (OUTPATIENT)
Facility: HOSPITAL | Age: 70
Discharge: HOME | End: 2020-06-10
Attending: ORTHOPAEDIC SURGERY | Admitting: ORTHOPAEDIC SURGERY
Payer: MEDICARE

## 2020-06-10 ENCOUNTER — APPOINTMENT (OUTPATIENT)
Dept: GENERAL RADIOLOGY | Facility: HOSPITAL | Age: 70
End: 2020-06-10
Attending: ORTHOPAEDIC SURGERY
Payer: MEDICARE

## 2020-06-10 ENCOUNTER — ANESTHESIA (OUTPATIENT)
Dept: SURGERY | Facility: HOSPITAL | Age: 70
End: 2020-06-10
Payer: MEDICARE

## 2020-06-10 ENCOUNTER — ANESTHESIA EVENT (OUTPATIENT)
Dept: SURGERY | Facility: HOSPITAL | Age: 70
End: 2020-06-10
Payer: MEDICARE

## 2020-06-10 VITALS
DIASTOLIC BLOOD PRESSURE: 69 MMHG | TEMPERATURE: 98 F | OXYGEN SATURATION: 95 % | HEIGHT: 70 IN | BODY MASS INDEX: 34.93 KG/M2 | WEIGHT: 244 LBS | RESPIRATION RATE: 18 BRPM | SYSTOLIC BLOOD PRESSURE: 159 MMHG | HEART RATE: 76 BPM

## 2020-06-10 DIAGNOSIS — Z01.818 PREOP TESTING: Primary | ICD-10-CM

## 2020-06-10 DIAGNOSIS — M16.12 PRIMARY OSTEOARTHRITIS OF LEFT HIP: ICD-10-CM

## 2020-06-10 PROBLEM — M16.9 DEGENERATIVE ARTHRITIS OF HIP: Status: ACTIVE | Noted: 2020-06-10

## 2020-06-10 PROCEDURE — 94010 BREATHING CAPACITY TEST: CPT | Performed by: ORTHOPAEDIC SURGERY

## 2020-06-10 PROCEDURE — 73502 X-RAY EXAM HIP UNI 2-3 VIEWS: CPT | Performed by: ORTHOPAEDIC SURGERY

## 2020-06-10 PROCEDURE — 86920 COMPATIBILITY TEST SPIN: CPT

## 2020-06-10 PROCEDURE — 88311 DECALCIFY TISSUE: CPT | Performed by: ORTHOPAEDIC SURGERY

## 2020-06-10 PROCEDURE — 97530 THERAPEUTIC ACTIVITIES: CPT

## 2020-06-10 PROCEDURE — 97116 GAIT TRAINING THERAPY: CPT

## 2020-06-10 PROCEDURE — 0SRB03A REPLACEMENT OF LEFT HIP JOINT WITH CERAMIC SYNTHETIC SUBSTITUTE, UNCEMENTED, OPEN APPROACH: ICD-10-PCS | Performed by: ORTHOPAEDIC SURGERY

## 2020-06-10 PROCEDURE — 97161 PT EVAL LOW COMPLEX 20 MIN: CPT

## 2020-06-10 PROCEDURE — 88305 TISSUE EXAM BY PATHOLOGIST: CPT | Performed by: ORTHOPAEDIC SURGERY

## 2020-06-10 PROCEDURE — 82962 GLUCOSE BLOOD TEST: CPT

## 2020-06-10 PROCEDURE — 76000 FLUOROSCOPY <1 HR PHYS/QHP: CPT | Performed by: ORTHOPAEDIC SURGERY

## 2020-06-10 DEVICE — TOTAL HIP W/POR CUP & COCR HD: Type: IMPLANTABLE DEVICE | Status: FUNCTIONAL

## 2020-06-10 DEVICE — BIOLOX DELTA CERAMIC FEMORAL HEAD +5.0 36MM DIA 12/14 TAPER
Type: IMPLANTABLE DEVICE | Site: HIP | Status: FUNCTIONAL
Brand: BIOLOX DELTA

## 2020-06-10 DEVICE — PINNACLE CANCELLOUS BONE SCREW 6.5MM X 20MM
Type: IMPLANTABLE DEVICE | Site: HIP | Status: FUNCTIONAL
Brand: PINNACLE

## 2020-06-10 DEVICE — PINNACLE CANCELLOUS BONE SCREW 6.5MM X 30MM
Type: IMPLANTABLE DEVICE | Site: HIP | Status: FUNCTIONAL
Brand: PINNACLE

## 2020-06-10 DEVICE — CERAMIC HEAD UPCHARGE: Type: IMPLANTABLE DEVICE | Status: FUNCTIONAL

## 2020-06-10 DEVICE — PINNACLE HIP SOLUTIONS ALTRX POLYETHYLENE ACETABULAR LINER +4 NEUTRAL 36MM ID 56MM OD
Type: IMPLANTABLE DEVICE | Site: HIP | Status: FUNCTIONAL
Brand: PINNACLE ALTRX

## 2020-06-10 DEVICE — PINNACLE GRIPTION ACETABULAR SHELL SECTOR 56MM OD
Type: IMPLANTABLE DEVICE | Site: HIP | Status: FUNCTIONAL
Brand: PINNACLE GRIPTION

## 2020-06-10 RX ORDER — ACETAMINOPHEN 500 MG
1000 TABLET ORAL ONCE
Status: DISCONTINUED | OUTPATIENT
Start: 2020-06-10 | End: 2020-06-10 | Stop reason: HOSPADM

## 2020-06-10 RX ORDER — ROCURONIUM BROMIDE 10 MG/ML
INJECTION, SOLUTION INTRAVENOUS AS NEEDED
Status: DISCONTINUED | OUTPATIENT
Start: 2020-06-10 | End: 2020-06-10 | Stop reason: SURG

## 2020-06-10 RX ORDER — METOCLOPRAMIDE HYDROCHLORIDE 5 MG/ML
10 INJECTION INTRAMUSCULAR; INTRAVENOUS EVERY 6 HOURS PRN
Status: DISCONTINUED | OUTPATIENT
Start: 2020-06-10 | End: 2020-06-10

## 2020-06-10 RX ORDER — FAMOTIDINE 20 MG/1
20 TABLET ORAL ONCE
Status: COMPLETED | OUTPATIENT
Start: 2020-06-10 | End: 2020-06-10

## 2020-06-10 RX ORDER — TRANEXAMIC ACID 10 MG/ML
INJECTION, SOLUTION INTRAVENOUS AS NEEDED
Status: DISCONTINUED | OUTPATIENT
Start: 2020-06-10 | End: 2020-06-10 | Stop reason: SURG

## 2020-06-10 RX ORDER — HYDROMORPHONE HYDROCHLORIDE 1 MG/ML
0.6 INJECTION, SOLUTION INTRAMUSCULAR; INTRAVENOUS; SUBCUTANEOUS EVERY 5 MIN PRN
Status: DISCONTINUED | OUTPATIENT
Start: 2020-06-10 | End: 2020-06-10

## 2020-06-10 RX ORDER — PROCHLORPERAZINE EDISYLATE 5 MG/ML
10 INJECTION INTRAMUSCULAR; INTRAVENOUS EVERY 6 HOURS PRN
Status: DISCONTINUED | OUTPATIENT
Start: 2020-06-10 | End: 2020-06-10

## 2020-06-10 RX ORDER — HYDROMORPHONE HYDROCHLORIDE 1 MG/ML
0.2 INJECTION, SOLUTION INTRAMUSCULAR; INTRAVENOUS; SUBCUTANEOUS EVERY 5 MIN PRN
Status: DISCONTINUED | OUTPATIENT
Start: 2020-06-10 | End: 2020-06-10

## 2020-06-10 RX ORDER — SENNOSIDES 8.6 MG
17.2 TABLET ORAL NIGHTLY
Status: DISCONTINUED | OUTPATIENT
Start: 2020-06-10 | End: 2020-06-10

## 2020-06-10 RX ORDER — SCOLOPAMINE TRANSDERMAL SYSTEM 1 MG/1
1 PATCH, EXTENDED RELEASE TRANSDERMAL ONCE
Status: DISCONTINUED | OUTPATIENT
Start: 2020-06-10 | End: 2020-06-10

## 2020-06-10 RX ORDER — MORPHINE SULFATE 4 MG/ML
4 INJECTION, SOLUTION INTRAMUSCULAR; INTRAVENOUS EVERY 10 MIN PRN
Status: DISCONTINUED | OUTPATIENT
Start: 2020-06-10 | End: 2020-06-10

## 2020-06-10 RX ORDER — ONDANSETRON 2 MG/ML
INJECTION INTRAMUSCULAR; INTRAVENOUS AS NEEDED
Status: DISCONTINUED | OUTPATIENT
Start: 2020-06-10 | End: 2020-06-10 | Stop reason: SURG

## 2020-06-10 RX ORDER — SODIUM CHLORIDE, SODIUM LACTATE, POTASSIUM CHLORIDE, CALCIUM CHLORIDE 600; 310; 30; 20 MG/100ML; MG/100ML; MG/100ML; MG/100ML
INJECTION, SOLUTION INTRAVENOUS CONTINUOUS
Status: DISCONTINUED | OUTPATIENT
Start: 2020-06-10 | End: 2020-06-10

## 2020-06-10 RX ORDER — ONDANSETRON 2 MG/ML
4 INJECTION INTRAMUSCULAR; INTRAVENOUS ONCE AS NEEDED
Status: DISCONTINUED | OUTPATIENT
Start: 2020-06-10 | End: 2020-06-10

## 2020-06-10 RX ORDER — DOCUSATE SODIUM 100 MG/1
100 CAPSULE, LIQUID FILLED ORAL 2 TIMES DAILY
Status: DISCONTINUED | OUTPATIENT
Start: 2020-06-10 | End: 2020-06-10

## 2020-06-10 RX ORDER — MORPHINE SULFATE 4 MG/ML
2 INJECTION, SOLUTION INTRAMUSCULAR; INTRAVENOUS EVERY 10 MIN PRN
Status: DISCONTINUED | OUTPATIENT
Start: 2020-06-10 | End: 2020-06-10

## 2020-06-10 RX ORDER — PANTOPRAZOLE SODIUM 40 MG/1
40 TABLET, DELAYED RELEASE ORAL
Status: DISCONTINUED | OUTPATIENT
Start: 2020-06-11 | End: 2020-06-10

## 2020-06-10 RX ORDER — POLYETHYLENE GLYCOL 3350 17 G/17G
17 POWDER, FOR SOLUTION ORAL DAILY PRN
Status: DISCONTINUED | OUTPATIENT
Start: 2020-06-10 | End: 2020-06-10

## 2020-06-10 RX ORDER — HYDRALAZINE HYDROCHLORIDE 25 MG/1
25 TABLET, FILM COATED ORAL EVERY 8 HOURS PRN
Status: DISCONTINUED | OUTPATIENT
Start: 2020-06-10 | End: 2020-06-10

## 2020-06-10 RX ORDER — DEXTROSE MONOHYDRATE 25 G/50ML
50 INJECTION, SOLUTION INTRAVENOUS
Status: DISCONTINUED | OUTPATIENT
Start: 2020-06-10 | End: 2020-06-10

## 2020-06-10 RX ORDER — AMLODIPINE BESYLATE 5 MG/1
5 TABLET ORAL DAILY
Status: DISCONTINUED | OUTPATIENT
Start: 2020-06-11 | End: 2020-06-10

## 2020-06-10 RX ORDER — LOSARTAN POTASSIUM 100 MG/1
100 TABLET ORAL DAILY
Status: DISCONTINUED | OUTPATIENT
Start: 2020-06-10 | End: 2020-06-10

## 2020-06-10 RX ORDER — ATORVASTATIN CALCIUM 40 MG/1
40 TABLET, FILM COATED ORAL DAILY
Status: DISCONTINUED | OUTPATIENT
Start: 2020-06-11 | End: 2020-06-10

## 2020-06-10 RX ORDER — MIDAZOLAM HYDROCHLORIDE 1 MG/ML
INJECTION INTRAMUSCULAR; INTRAVENOUS AS NEEDED
Status: DISCONTINUED | OUTPATIENT
Start: 2020-06-10 | End: 2020-06-10 | Stop reason: SURG

## 2020-06-10 RX ORDER — LIDOCAINE HYDROCHLORIDE 10 MG/ML
INJECTION, SOLUTION EPIDURAL; INFILTRATION; INTRACAUDAL; PERINEURAL AS NEEDED
Status: DISCONTINUED | OUTPATIENT
Start: 2020-06-10 | End: 2020-06-10 | Stop reason: SURG

## 2020-06-10 RX ORDER — NEOSTIGMINE METHYLSULFATE 1 MG/ML
INJECTION INTRAVENOUS AS NEEDED
Status: DISCONTINUED | OUTPATIENT
Start: 2020-06-10 | End: 2020-06-10 | Stop reason: SURG

## 2020-06-10 RX ORDER — ASPIRIN 325 MG
325 TABLET ORAL 2 TIMES DAILY
Status: DISCONTINUED | OUTPATIENT
Start: 2020-06-10 | End: 2020-06-10

## 2020-06-10 RX ORDER — DEXAMETHASONE SODIUM PHOSPHATE 4 MG/ML
VIAL (ML) INJECTION AS NEEDED
Status: DISCONTINUED | OUTPATIENT
Start: 2020-06-10 | End: 2020-06-10 | Stop reason: SURG

## 2020-06-10 RX ORDER — GLYCOPYRROLATE 0.2 MG/ML
INJECTION, SOLUTION INTRAMUSCULAR; INTRAVENOUS AS NEEDED
Status: DISCONTINUED | OUTPATIENT
Start: 2020-06-10 | End: 2020-06-10 | Stop reason: SURG

## 2020-06-10 RX ORDER — CEFAZOLIN SODIUM/WATER 2 G/20 ML
2 SYRINGE (ML) INTRAVENOUS ONCE
Status: COMPLETED | OUTPATIENT
Start: 2020-06-10 | End: 2020-06-10

## 2020-06-10 RX ORDER — BISACODYL 10 MG
10 SUPPOSITORY, RECTAL RECTAL
Status: DISCONTINUED | OUTPATIENT
Start: 2020-06-10 | End: 2020-06-10

## 2020-06-10 RX ORDER — FENOFIBRATE 134 MG/1
134 CAPSULE ORAL
Status: DISCONTINUED | OUTPATIENT
Start: 2020-06-11 | End: 2020-06-10

## 2020-06-10 RX ORDER — MORPHINE SULFATE 10 MG/ML
6 INJECTION, SOLUTION INTRAMUSCULAR; INTRAVENOUS EVERY 10 MIN PRN
Status: DISCONTINUED | OUTPATIENT
Start: 2020-06-10 | End: 2020-06-10

## 2020-06-10 RX ORDER — ACETAMINOPHEN 325 MG/1
650 TABLET ORAL EVERY 4 HOURS PRN
Status: DISCONTINUED | OUTPATIENT
Start: 2020-06-10 | End: 2020-06-10

## 2020-06-10 RX ORDER — ONDANSETRON 2 MG/ML
4 INJECTION INTRAMUSCULAR; INTRAVENOUS EVERY 4 HOURS PRN
Status: DISCONTINUED | OUTPATIENT
Start: 2020-06-10 | End: 2020-06-10

## 2020-06-10 RX ORDER — TRANEXAMIC ACID 10 MG/ML
1000 INJECTION, SOLUTION INTRAVENOUS ONCE
Status: COMPLETED | OUTPATIENT
Start: 2020-06-10 | End: 2020-06-10

## 2020-06-10 RX ORDER — SODIUM CHLORIDE 9 MG/ML
INJECTION, SOLUTION INTRAVENOUS CONTINUOUS PRN
Status: DISCONTINUED | OUTPATIENT
Start: 2020-06-10 | End: 2020-06-10 | Stop reason: SURG

## 2020-06-10 RX ORDER — HYDROCODONE BITARTRATE AND ACETAMINOPHEN 5; 325 MG/1; MG/1
2 TABLET ORAL AS NEEDED
Status: DISCONTINUED | OUTPATIENT
Start: 2020-06-10 | End: 2020-06-10

## 2020-06-10 RX ORDER — GABAPENTIN 600 MG/1
600 TABLET ORAL ONCE
Status: COMPLETED | OUTPATIENT
Start: 2020-06-10 | End: 2020-06-10

## 2020-06-10 RX ORDER — DIPHENHYDRAMINE HYDROCHLORIDE 50 MG/ML
25 INJECTION INTRAMUSCULAR; INTRAVENOUS ONCE AS NEEDED
Status: DISCONTINUED | OUTPATIENT
Start: 2020-06-10 | End: 2020-06-10

## 2020-06-10 RX ORDER — SODIUM PHOSPHATE, DIBASIC AND SODIUM PHOSPHATE, MONOBASIC 7; 19 G/133ML; G/133ML
1 ENEMA RECTAL ONCE AS NEEDED
Status: DISCONTINUED | OUTPATIENT
Start: 2020-06-10 | End: 2020-06-10

## 2020-06-10 RX ORDER — HYDROCODONE BITARTRATE AND ACETAMINOPHEN 5; 325 MG/1; MG/1
2 TABLET ORAL EVERY 4 HOURS PRN
Status: DISCONTINUED | OUTPATIENT
Start: 2020-06-10 | End: 2020-06-10

## 2020-06-10 RX ORDER — ACETAMINOPHEN 500 MG
1000 TABLET ORAL ONCE
Status: COMPLETED | OUTPATIENT
Start: 2020-06-10 | End: 2020-06-10

## 2020-06-10 RX ORDER — HYDROMORPHONE HYDROCHLORIDE 1 MG/ML
0.4 INJECTION, SOLUTION INTRAMUSCULAR; INTRAVENOUS; SUBCUTANEOUS EVERY 5 MIN PRN
Status: DISCONTINUED | OUTPATIENT
Start: 2020-06-10 | End: 2020-06-10

## 2020-06-10 RX ORDER — HALOPERIDOL 5 MG/ML
0.25 INJECTION INTRAMUSCULAR ONCE AS NEEDED
Status: DISCONTINUED | OUTPATIENT
Start: 2020-06-10 | End: 2020-06-10

## 2020-06-10 RX ORDER — KETOROLAC TROMETHAMINE 15 MG/ML
15 INJECTION, SOLUTION INTRAMUSCULAR; INTRAVENOUS EVERY 6 HOURS PRN
Status: DISCONTINUED | OUTPATIENT
Start: 2020-06-10 | End: 2020-06-10

## 2020-06-10 RX ORDER — METOCLOPRAMIDE 10 MG/1
10 TABLET ORAL ONCE
Status: COMPLETED | OUTPATIENT
Start: 2020-06-10 | End: 2020-06-10

## 2020-06-10 RX ORDER — HYDROCODONE BITARTRATE AND ACETAMINOPHEN 5; 325 MG/1; MG/1
1 TABLET ORAL EVERY 4 HOURS PRN
Status: DISCONTINUED | OUTPATIENT
Start: 2020-06-10 | End: 2020-06-10

## 2020-06-10 RX ORDER — CELECOXIB 200 MG/1
200 CAPSULE ORAL ONCE
Status: COMPLETED | OUTPATIENT
Start: 2020-06-10 | End: 2020-06-10

## 2020-06-10 RX ORDER — PROCHLORPERAZINE EDISYLATE 5 MG/ML
5 INJECTION INTRAMUSCULAR; INTRAVENOUS ONCE AS NEEDED
Status: DISCONTINUED | OUTPATIENT
Start: 2020-06-10 | End: 2020-06-10

## 2020-06-10 RX ORDER — HYDROCODONE BITARTRATE AND ACETAMINOPHEN 5; 325 MG/1; MG/1
1 TABLET ORAL AS NEEDED
Status: DISCONTINUED | OUTPATIENT
Start: 2020-06-10 | End: 2020-06-10

## 2020-06-10 RX ORDER — LABETALOL HYDROCHLORIDE 5 MG/ML
INJECTION, SOLUTION INTRAVENOUS AS NEEDED
Status: DISCONTINUED | OUTPATIENT
Start: 2020-06-10 | End: 2020-06-10 | Stop reason: SURG

## 2020-06-10 RX ORDER — NALOXONE HYDROCHLORIDE 0.4 MG/ML
80 INJECTION, SOLUTION INTRAMUSCULAR; INTRAVENOUS; SUBCUTANEOUS AS NEEDED
Status: DISCONTINUED | OUTPATIENT
Start: 2020-06-10 | End: 2020-06-10

## 2020-06-10 RX ADMIN — SODIUM CHLORIDE, SODIUM LACTATE, POTASSIUM CHLORIDE, CALCIUM CHLORIDE: 600; 310; 30; 20 INJECTION, SOLUTION INTRAVENOUS at 12:02:00

## 2020-06-10 RX ADMIN — DEXAMETHASONE SODIUM PHOSPHATE 4 MG: 4 MG/ML VIAL (ML) INJECTION at 10:30:00

## 2020-06-10 RX ADMIN — ROCURONIUM BROMIDE 5 MG: 10 INJECTION, SOLUTION INTRAVENOUS at 10:07:00

## 2020-06-10 RX ADMIN — LABETALOL HYDROCHLORIDE 5 MG: 5 INJECTION, SOLUTION INTRAVENOUS at 11:28:00

## 2020-06-10 RX ADMIN — GLYCOPYRROLATE 0.8 MG: 0.2 INJECTION, SOLUTION INTRAMUSCULAR; INTRAVENOUS at 11:57:00

## 2020-06-10 RX ADMIN — TRANEXAMIC ACID 1000 MG: 10 INJECTION, SOLUTION INTRAVENOUS at 10:24:00

## 2020-06-10 RX ADMIN — LIDOCAINE HYDROCHLORIDE 50 MG: 10 INJECTION, SOLUTION EPIDURAL; INFILTRATION; INTRACAUDAL; PERINEURAL at 10:07:00

## 2020-06-10 RX ADMIN — MIDAZOLAM HYDROCHLORIDE 2 MG: 1 INJECTION INTRAMUSCULAR; INTRAVENOUS at 10:04:00

## 2020-06-10 RX ADMIN — ONDANSETRON 4 MG: 2 INJECTION INTRAMUSCULAR; INTRAVENOUS at 12:04:00

## 2020-06-10 RX ADMIN — ROCURONIUM BROMIDE 45 MG: 10 INJECTION, SOLUTION INTRAVENOUS at 10:15:00

## 2020-06-10 RX ADMIN — SODIUM CHLORIDE: 9 INJECTION, SOLUTION INTRAVENOUS at 10:19:00

## 2020-06-10 RX ADMIN — ROCURONIUM BROMIDE 10 MG: 10 INJECTION, SOLUTION INTRAVENOUS at 10:51:00

## 2020-06-10 RX ADMIN — NEOSTIGMINE METHYLSULFATE 5 MG: 1 INJECTION INTRAVENOUS at 11:57:00

## 2020-06-10 RX ADMIN — ROCURONIUM BROMIDE 10 MG: 10 INJECTION, SOLUTION INTRAVENOUS at 11:22:00

## 2020-06-10 RX ADMIN — CEFAZOLIN SODIUM/WATER 2 G: 2 G/20 ML SYRINGE (ML) INTRAVENOUS at 10:24:00

## 2020-06-10 NOTE — INTERVAL H&P NOTE
Pre-op Diagnosis: Primary osteoarthritis of left hip [M16.12]    The above referenced H&P was reviewed by Yee Kingsley DO on 6/10/2020, the patient was examined and no significant changes have occurred in the patient's condition since the H&P was

## 2020-06-10 NOTE — ANESTHESIA PROCEDURE NOTES
Airway  Urgency: Elective    Airway not difficult    General Information and Staff    Patient location during procedure: OR  Anesthesiologist: Sagrario Lemus, DO  Resident/CRNA: Zuleyka Arriola CRNA  Performed: anesthesiologist and CRNA     Indicat

## 2020-06-10 NOTE — ANESTHESIA PREPROCEDURE EVALUATION
Anesthesia PreOp Note    HPI:     Angus Zavala is a 68year old male who presents for preoperative consultation requested by: Elbert Hall DO    Date of Surgery: 6/10/2020    Procedure(s):  HIP TOTAL ANTERIOR APPROACH  Indication: Primary oste Deborah Perry MD at [facility]       acetaminophen 500 MG Oral Tab, Take 2 tablets (1000 mg total) by mouth every 8 (eight) hours as needed for pain., Disp: 60 tablet, Rfl: 0  latanoprost 0.005 % Ophthalmic Solution, Place 1 drop into both eyes nig infusion, , Intravenous, Continuous, Elbert aHll, DO  acetaminophen (TYLENOL EXTRA STRENGTH) tab 1,000 mg, 1,000 mg, Oral, Once, Elbert Hall, DO  Metoclopramide HCl (REGLAN) tab 10 mg, 10 mg, Oral, Once, Elbert Hall, DO  scopo occasional beer. Drug use: No      Sexual activity: Not on file    Lifestyle      Physical activity:        Days per week: Not on file        Minutes per session: Not on file      Stress: Not on file    Relationships      Social connections:         Ta is 166/83 (abnormal). His respiration is 18 and oxygen saturation is 98%.     06/05/20  1534 06/10/20  0753   BP:  (!) 166/83   Resp:  18   SpO2:  98%   Weight: 108.9 kg (240 lb) 110.7 kg (244 lb)   Height: 1.778 m (5' 10\") 1.778 m (5' 10\")        Anesthe

## 2020-06-10 NOTE — PHYSICAL THERAPY NOTE
PHYSICAL THERAPY HIP EVALUATION - INPATIENT     Room Number: [facility]  Evaluation Date: 6/10/2020  Type of Evaluation: Initial  Physician Order: PT Eval and Treat    Presenting Problem: elective left SANJUANITA, anterior approach, WBAT  Reason for Therapy: in preparation for discharge.     DISCHARGE RECOMMENDATIONS  PT Discharge Recommendations: 24 hour care/supervision;Home;Outpatient PT    PLAN      d/c home w/wife, OP PT          PHYSICAL THERAPY MEDICAL/SOCIAL HISTORY     History related to current admiss SANJUANITA - anterior;Limb alert - left  Fall Risk: Standard fall risk    WEIGHT BEARING RESTRICTION  Weight Bearing Restriction: L lower extremity           L Lower Extremity: Weight Bearing as Tolerated    PAIN ASSESSMENT  Rating: 3  Location: right hip  Manage 150'x2  Assistive Device: Rolling walker  Pattern: L Decreased stance time  Stoop/Curb Assistance: Minimum assistance  Comment : Patient with mildly antalgic gait on LLE but good weight acceptance and stability.  Step to progressing to step through pattern, verbalize understanding of hip precautions, mobility   Goal #6 Patient independently performs home exercise program for ROM/strengthening per the instructions provided in preparation for discharge.    Goal #6  Current Status MET for Basic HEP of QS, AP, LAQ

## 2020-06-10 NOTE — ANESTHESIA POSTPROCEDURE EVALUATION
Patient: Amber Carrera    Procedure Summary     Date:  06/10/20 Room / Location:  [address] [place] [address] MAIN OR 05 / 300 [facility] MAIN OR    Anesthesia Start:  1004 Anesthesia Stop:  3704    Procedure:  HIP TOTAL ANTERIOR APPROACH (Left Hip) Diagnosis:       Primary osteoarthritis

## 2020-06-10 NOTE — ANESTHESIA PROCEDURE NOTES
Peripheral IV  Date/Time: 6/10/2020 10:12 AM  Inserted by: Sallie Morgan CRNA    Placement  Needle size: 18 G  Laterality: left  Location: hand  Local anesthetic: none  Site prep: alcohol  Technique: anatomical landmarks  Attempts: 1

## 2020-06-10 NOTE — OPERATIVE REPORT
DOS 6/10/2020    PREOPERATIVE DIAGNOSIS: LEFT Hip Arthritis    POSTOPERATIVE DIAGNOSIS: Same    PROCEDURE: LEFT Total Hip arthroplasty, Direct anterior approach    SURGEON: Emily Wetzel.  Doreen Chaparro, [address]: Angella Macias; Cait Mendes [facility] separation of the fascia from the muscle was performed medially down to the hip capsule lateral to the rectus femoris muscle. The anterior circumflex vessels were identified and coagulated.  The capsule was identified and a Cobra retractor was placed along trunnion. The hip was then reduced into a clear acetabulum. Once the hip was reduced, excellent range of motion and stability were noted, External Rotation >80 degrees without posterior impingement.   Next, the joint was thoroughly irrigated with normal

## 2020-06-10 NOTE — BRIEF OP NOTE
Pre-Operative Diagnosis: Primary osteoarthritis of left hip [M16.12]     Post-Operative Diagnosis: Primary osteoarthritis of left hip [M16.12]      Procedure Performed:   Procedure(s):  LEFT HIP ARTHROPLASTY ANTERIOR APPROACH    Surgeon(s) and Role:     *

## 2020-08-05 PROBLEM — M16.12 ARTHRITIS OF LEFT HIP: Status: ACTIVE | Noted: 2020-08-05

## 2020-08-17 ENCOUNTER — TELEPHONE (OUTPATIENT)
Dept: CARDIOLOGY | Age: 70
End: 2020-08-17

## 2020-09-08 ENCOUNTER — TELEPHONE (OUTPATIENT)
Dept: CARDIOLOGY | Age: 70
End: 2020-09-08

## 2020-09-11 ENCOUNTER — OFFICE VISIT (OUTPATIENT)
Dept: CARDIOLOGY | Age: 70
End: 2020-09-11

## 2020-09-11 VITALS
DIASTOLIC BLOOD PRESSURE: 80 MMHG | OXYGEN SATURATION: 98 % | SYSTOLIC BLOOD PRESSURE: 150 MMHG | WEIGHT: 238 LBS | HEIGHT: 70 IN | BODY MASS INDEX: 34.07 KG/M2 | HEART RATE: 78 BPM

## 2020-09-11 DIAGNOSIS — E78.49 OTHER HYPERLIPIDEMIA: ICD-10-CM

## 2020-09-11 DIAGNOSIS — I25.10 CORONARY ARTERY DISEASE INVOLVING NATIVE CORONARY ARTERY OF NATIVE HEART WITHOUT ANGINA PECTORIS: Primary | ICD-10-CM

## 2020-09-11 DIAGNOSIS — I10 ESSENTIAL HYPERTENSION: ICD-10-CM

## 2020-09-11 PROCEDURE — 99214 OFFICE O/P EST MOD 30 MIN: CPT | Performed by: INTERNAL MEDICINE

## 2020-09-11 RX ORDER — TRAMADOL HYDROCHLORIDE 50 MG/1
50 TABLET ORAL DAILY
COMMUNITY
Start: 2020-06-05 | End: 2020-09-11

## 2020-09-11 RX ORDER — PREGABALIN 75 MG/1
75 CAPSULE ORAL DAILY
COMMUNITY
Start: 2020-06-04 | End: 2020-09-11

## 2020-09-11 RX ORDER — CELECOXIB 200 MG/1
200 CAPSULE ORAL DAILY
COMMUNITY
Start: 2020-06-05 | End: 2020-09-11

## 2020-09-11 ASSESSMENT — PATIENT HEALTH QUESTIONNAIRE - PHQ9
1. LITTLE INTEREST OR PLEASURE IN DOING THINGS: NOT AT ALL
SUM OF ALL RESPONSES TO PHQ9 QUESTIONS 1 AND 2: 0
CLINICAL INTERPRETATION OF PHQ2 SCORE: NO FURTHER SCREENING NEEDED
2. FEELING DOWN, DEPRESSED OR HOPELESS: NOT AT ALL
CLINICAL INTERPRETATION OF PHQ9 SCORE: NO FURTHER SCREENING NEEDED
SUM OF ALL RESPONSES TO PHQ9 QUESTIONS 1 AND 2: 0

## 2020-10-01 LAB
CHOLEST SERPL-MCNC: 125 MG/DL
CHOLEST/HDLC SERPL: 4 {RATIO}
HDLC SERPL-MCNC: 33 MG/DL
LDLC SERPL CALC-MCNC: 62 MG/DL
LENGTH OF FAST TIME PATIENT: YES H
TRIGL SERPL-MCNC: 148 MG/DL
VLDLC SERPL CALC-MCNC: 30 MG/DL

## 2020-10-09 RX ORDER — ATORVASTATIN CALCIUM 40 MG/1
40 TABLET, FILM COATED ORAL AT BEDTIME
Qty: 90 TABLET | Refills: 2 | Status: SHIPPED | OUTPATIENT
Start: 2020-10-09 | End: 2021-04-29

## 2020-12-11 PROBLEM — M16.0 PRIMARY OSTEOARTHRITIS OF BOTH HIPS: Status: RESOLVED | Noted: 2019-11-21 | Resolved: 2020-12-11

## 2020-12-11 PROBLEM — M16.12 ARTHRITIS OF LEFT HIP: Status: RESOLVED | Noted: 2020-08-05 | Resolved: 2020-12-11

## 2020-12-11 PROBLEM — M16.9 DEGENERATIVE ARTHRITIS OF HIP: Status: RESOLVED | Noted: 2020-06-10 | Resolved: 2020-12-11

## 2020-12-11 PROBLEM — Z01.818 PREOP TESTING: Status: RESOLVED | Noted: 2020-06-10 | Resolved: 2020-12-11

## 2021-02-02 DIAGNOSIS — Z23 NEED FOR VACCINATION: ICD-10-CM

## 2021-02-05 ENCOUNTER — IMMUNIZATION (OUTPATIENT)
Dept: LAB | Age: 71
End: 2021-02-05
Attending: HOSPITALIST
Payer: MEDICARE

## 2021-02-05 DIAGNOSIS — Z23 NEED FOR VACCINATION: Primary | ICD-10-CM

## 2021-02-05 PROCEDURE — 0001A SARSCOV2 VAC 30MCG/0.3ML IM: CPT

## 2021-02-26 ENCOUNTER — IMMUNIZATION (OUTPATIENT)
Dept: LAB | Age: 71
End: 2021-02-26
Attending: HOSPITALIST
Payer: MEDICARE

## 2021-02-26 DIAGNOSIS — Z23 NEED FOR VACCINATION: Primary | ICD-10-CM

## 2021-02-26 PROCEDURE — 0002A SARSCOV2 VAC 30MCG/0.3ML IM: CPT

## 2021-03-25 ENCOUNTER — TELEPHONE (OUTPATIENT)
Dept: CARDIOLOGY | Age: 71
End: 2021-03-25

## 2021-04-29 RX ORDER — ATORVASTATIN CALCIUM 40 MG/1
40 TABLET, FILM COATED ORAL AT BEDTIME
Qty: 90 TABLET | Refills: 1 | Status: SHIPPED | OUTPATIENT
Start: 2021-04-29

## 2021-08-05 PROBLEM — I10 DIABETES MELLITUS WITH COINCIDENT HYPERTENSION (HCC): Status: ACTIVE | Noted: 2021-08-05

## 2021-08-05 PROBLEM — E11.9 DIABETES MELLITUS WITH COINCIDENT HYPERTENSION: Status: ACTIVE | Noted: 2021-08-05

## 2021-08-05 PROBLEM — I70.0 AORTIC ATHEROSCLEROSIS (HCC): Status: ACTIVE | Noted: 2021-08-05

## 2021-08-05 PROBLEM — E11.9 DIABETES MELLITUS WITH COINCIDENT HYPERTENSION  (HCC): Status: ACTIVE | Noted: 2021-08-05

## 2021-08-05 PROBLEM — I10 DIABETES MELLITUS WITH COINCIDENT HYPERTENSION  (HCC): Status: ACTIVE | Noted: 2021-08-05

## 2021-08-05 PROBLEM — I10 DIABETES MELLITUS WITH COINCIDENT HYPERTENSION: Status: ACTIVE | Noted: 2021-08-05

## 2021-08-05 PROBLEM — E11.9 DIABETES MELLITUS WITH COINCIDENT HYPERTENSION (HCC): Status: ACTIVE | Noted: 2021-08-05

## 2021-09-07 ENCOUNTER — APPOINTMENT (OUTPATIENT)
Dept: CARDIOLOGY | Age: 71
End: 2021-09-07
Attending: INTERNAL MEDICINE

## 2021-09-14 ENCOUNTER — APPOINTMENT (OUTPATIENT)
Dept: CARDIOLOGY | Age: 71
End: 2021-09-14

## 2021-09-27 ENCOUNTER — APPOINTMENT (OUTPATIENT)
Dept: CARDIOLOGY | Age: 71
End: 2021-09-27
Attending: INTERNAL MEDICINE

## 2021-10-05 ENCOUNTER — APPOINTMENT (OUTPATIENT)
Dept: CARDIOLOGY | Age: 71
End: 2021-10-05

## 2024-01-24 ENCOUNTER — HOSPITAL ENCOUNTER (EMERGENCY)
Facility: HOSPITAL | Age: 74
Discharge: HOME | End: 2024-01-24
Attending: EMERGENCY MEDICINE
Payer: MEDICARE

## 2024-01-24 ENCOUNTER — APPOINTMENT (OUTPATIENT)
Dept: MRI IMAGING | Facility: HOSPITAL | Age: 74
End: 2024-01-24
Attending: EMERGENCY MEDICINE
Payer: MEDICARE

## 2024-01-24 ENCOUNTER — APPOINTMENT (OUTPATIENT)
Dept: CT IMAGING | Facility: HOSPITAL | Age: 74
End: 2024-01-24
Attending: EMERGENCY MEDICINE
Payer: MEDICARE

## 2024-01-24 VITALS
TEMPERATURE: 98 F | BODY MASS INDEX: 34 KG/M2 | WEIGHT: 230 LBS | RESPIRATION RATE: 16 BRPM | HEART RATE: 74 BPM | DIASTOLIC BLOOD PRESSURE: 64 MMHG | OXYGEN SATURATION: 95 % | SYSTOLIC BLOOD PRESSURE: 137 MMHG

## 2024-01-24 DIAGNOSIS — G45.9 TIA DUE TO EMBOLISM (HCC): ICD-10-CM

## 2024-01-24 DIAGNOSIS — I74.9 TIA DUE TO EMBOLISM (HCC): ICD-10-CM

## 2024-01-24 DIAGNOSIS — G45.9 TIA (TRANSIENT ISCHEMIC ATTACK): Primary | ICD-10-CM

## 2024-01-24 LAB
ANION GAP SERPL CALC-SCNC: 11 MMOL/L (ref 0–18)
BASOPHILS # BLD AUTO: 0.1 X10(3) UL (ref 0–0.2)
BASOPHILS NFR BLD AUTO: 1.4 %
BUN BLD-MCNC: 14 MG/DL (ref 9–23)
BUN/CREAT SERPL: 13.1 (ref 10–20)
CALCIUM BLD-MCNC: 9.5 MG/DL (ref 8.7–10.4)
CHLORIDE SERPL-SCNC: 105 MMOL/L (ref 98–112)
CO2 SERPL-SCNC: 22 MMOL/L (ref 21–32)
CREAT BLD-MCNC: 1.07 MG/DL
DEPRECATED RDW RBC AUTO: 39.8 FL (ref 35.1–46.3)
EGFRCR SERPLBLD CKD-EPI 2021: 72 ML/MIN/1.73M2 (ref 60–?)
EOSINOPHIL # BLD AUTO: 0.16 X10(3) UL (ref 0–0.7)
EOSINOPHIL NFR BLD AUTO: 2.2 %
ERYTHROCYTE [DISTWIDTH] IN BLOOD BY AUTOMATED COUNT: 12.6 % (ref 11–15)
GLUCOSE BLD-MCNC: 164 MG/DL (ref 70–99)
GLUCOSE BLDC GLUCOMTR-MCNC: 173 MG/DL (ref 70–99)
HCT VFR BLD AUTO: 40.7 %
HGB BLD-MCNC: 14.7 G/DL
IMM GRANULOCYTES # BLD AUTO: 0.02 X10(3) UL (ref 0–1)
IMM GRANULOCYTES NFR BLD: 0.3 %
LYMPHOCYTES # BLD AUTO: 1.83 X10(3) UL (ref 1–4)
LYMPHOCYTES NFR BLD AUTO: 24.8 %
MCH RBC QN AUTO: 31.3 PG (ref 26–34)
MCHC RBC AUTO-ENTMCNC: 36.1 G/DL (ref 31–37)
MCV RBC AUTO: 86.8 FL
MONOCYTES # BLD AUTO: 0.53 X10(3) UL (ref 0.1–1)
MONOCYTES NFR BLD AUTO: 7.2 %
NEUTROPHILS # BLD AUTO: 4.74 X10 (3) UL (ref 1.5–7.7)
NEUTROPHILS # BLD AUTO: 4.74 X10(3) UL (ref 1.5–7.7)
NEUTROPHILS NFR BLD AUTO: 64.1 %
OSMOLALITY SERPL CALC.SUM OF ELEC: 290 MOSM/KG (ref 275–295)
PLATELET # BLD AUTO: 242 10(3)UL (ref 150–450)
POTASSIUM SERPL-SCNC: 3.8 MMOL/L (ref 3.5–5.1)
RBC # BLD AUTO: 4.69 X10(6)UL
SODIUM SERPL-SCNC: 138 MMOL/L (ref 136–145)
TROPONIN I SERPL HS-MCNC: <3 NG/L
WBC # BLD AUTO: 7.4 X10(3) UL (ref 4–11)

## 2024-01-24 PROCEDURE — 93010 ELECTROCARDIOGRAM REPORT: CPT

## 2024-01-24 PROCEDURE — 84484 ASSAY OF TROPONIN QUANT: CPT | Performed by: EMERGENCY MEDICINE

## 2024-01-24 PROCEDURE — 70498 CT ANGIOGRAPHY NECK: CPT | Performed by: EMERGENCY MEDICINE

## 2024-01-24 PROCEDURE — 93005 ELECTROCARDIOGRAM TRACING: CPT

## 2024-01-24 PROCEDURE — 70551 MRI BRAIN STEM W/O DYE: CPT | Performed by: EMERGENCY MEDICINE

## 2024-01-24 PROCEDURE — 85025 COMPLETE CBC W/AUTO DIFF WBC: CPT | Performed by: EMERGENCY MEDICINE

## 2024-01-24 PROCEDURE — 99285 EMERGENCY DEPT VISIT HI MDM: CPT

## 2024-01-24 PROCEDURE — 70496 CT ANGIOGRAPHY HEAD: CPT | Performed by: EMERGENCY MEDICINE

## 2024-01-24 PROCEDURE — 82962 GLUCOSE BLOOD TEST: CPT

## 2024-01-24 PROCEDURE — 80048 BASIC METABOLIC PNL TOTAL CA: CPT | Performed by: EMERGENCY MEDICINE

## 2024-01-24 PROCEDURE — 36415 COLL VENOUS BLD VENIPUNCTURE: CPT

## 2024-01-24 RX ORDER — CLOPIDOGREL BISULFATE 75 MG/1
75 TABLET ORAL ONCE
Status: COMPLETED | OUTPATIENT
Start: 2024-01-24 | End: 2024-01-24

## 2024-01-24 RX ORDER — CLOPIDOGREL BISULFATE 75 MG/1
75 TABLET ORAL DAILY
Qty: 30 TABLET | Refills: 0 | Status: SHIPPED | OUTPATIENT
Start: 2024-01-24

## 2024-01-24 NOTE — ED PROVIDER NOTES
Patient Seen in: [facility] Emergency Department    History     Chief Complaint   Patient presents with    Eye Visual Problem       HPI    History is provided by patient/independent historian: patient  73-year-old male with history of Crohn's disease, diabetes, hypertension, hyperlipidemia, here with complaints of blurred vision to the left upper quadrant out of his left eye earlier this afternoon.  Dizziness sensation with that but that only lasted several minutes.  symptoms lasted for approximately 1 hour and resolved spontaneously.  He did have an associated dizziness.  He called his friend who is a physicianand was recommended to come in to the emergency department for further evaluation.  No associated chest pain or shortness of breath.  No leg or arm symptoms.      History reviewed.   Past Medical History:   Diagnosis Date    Aortic atherosclerosis (HCC) 2021    Cholecystitis     Crohn's disease (HCC)     Diabetes (HCC) 2020    no medications    Diabetes mellitus with coincident hypertension  (HCC) 2021    Glaucoma     Hypercholesterolemia     Osteoarthritis     Stomach ulcer     Unspecified essential hypertension          History reviewed.   Past Surgical History:   Procedure Laterality Date    CABG      CHOLECYSTECTOMY      COLONOSCOPY  2018    normal    COLONOSCOPY N/A 2018    Procedure: COLONOSCOPY, POSSIBLE BIOPSY, POSSIBLE POLYPECTOMY 05135;  Surgeon: Cruz Chow MD;  [place] [facility] [place] [facility]    HIP SURGERY Left     Left SANJUANITA    OTHER SURGICAL HISTORY  2019    repair of anal fistula         Home Medications reviewed :  (Not in a hospital admission)        History reviewed.   Social History     Socioeconomic History    Marital status:    Tobacco Use    Smoking status: Former     Types: Cigarettes     Quit date: 2001     Years since quittin.0    Smokeless tobacco: Never   Vaping Use    Vaping Use: Never used   Substance and Sexual  Activity    Alcohol use: Yes     Comment: occasional beer.    Drug use: No   Other Topics Concern    Caffeine Concern Yes     Comment: coffee, 2 cups/day         ROS  Review of Systems   Eyes:  Positive for visual disturbance.   Respiratory:  Negative for shortness of breath.    Cardiovascular:  Negative for chest pain.   Neurological:  Positive for dizziness.   All other systems reviewed and are negative.     All other pertinent organ systems are reviewed and are negative.      Physical Exam     ED Triage Vitals [01/24/24 1548]   BP (!) 180/70   Pulse 91   Resp 20   Temp 98.2 °F (36.8 °C)   Temp src Oral   SpO2 97 %   O2 Device None (Room air)     Vital signs reviewed.      Physical Exam  Vitals and nursing note reviewed.   Eyes:      Comments: Normal visual fields   Cardiovascular:      Pulses: Normal pulses.   Pulmonary:      Effort: No respiratory distress.   Abdominal:      General: There is no distension.   Neurological:      Mental Status: He is alert.      Comments: 5/5 strength in b/l UEs and LEs, normal sensation in all extremities, normal finger to nose b/l, normal heel to shin b/l, no pronator drift,  no facial asymmetry, normal speech           ED Course       Labs:     Labs Reviewed   BASIC METABOLIC PANEL (8) - Abnormal; Notable for the following components:       Result Value    Glucose 164 (*)     All other components within normal limits   POCT GLUCOSE - Abnormal; Notable for the following components:    POC Glucose  173 (*)     All other components within normal limits   TROPONIN I HIGH SENSITIVITY - Normal   CBC WITH DIFFERENTIAL WITH PLATELET    Narrative:     The following orders were created for panel order CBC With Differential With Platelet.                  Procedure                               Abnormality         Status                                     ---------                               -----------         ------                                     CBC W/ DIFFERENTIAL[[id number]]                               Final result                                                 Please view results for these tests on the individual orders.   CBC W/ DIFFERENTIAL         My EKG Interpretation: EKG    Rate, intervals and axes as noted on EKG Report.  Rate: 76  Rhythm: Sinus Rhythm  Reading: normal for rate, normal for rhythm, no acute ST chnages           As reviewed and Interpreted by me      Imaging Results Available and Reviewed while in ED:   CTA BRAIN + CTA CAROTIDS (CPT=70496/54236)    Result Date: 1/24/2024  CONCLUSION:    No proximal large artery occlusion in the brain  Multifocal occlusion and or stenosis in the right vertebral artery, age indeterminate  Less than 50 percent bilateral ICA stenosis due to calcified plaque   Dictated by (CST): Cecil Mg MD on 1/24/2024 at 7:04 PM     Finalized by (CST): Cecil Mg MD on 1/24/2024 at 7:12 PM          MRI BRAIN WO ACUTE (3) SEQUENCE (CPT=70551)    Result Date: 1/24/2024  CONCLUSION:   Limited 3-sequence stroke protocol study was performed. Within these parameters:  1. No acute intracranial process. 2. Moderate chronic microangiopathic ischemic changes.   Elm-remote  Dictated by (CST): Fabrizio Rowe MD on 1/24/2024 at 5:39 PM     Finalized by (CST): Fabrizio Rowe MD on 1/24/2024 at 5:41 PM         My review and independent interpretation of MRI images: no ICH. Radiology report corroborates this in addition to other details as reported by them.      Decision rules/scores evaluated: none      Diagnostic labs/tests considered but not ordered: none    ED Medications Administered:   Medications   clopidogrel (Plavix) tab 75 mg (has no administration in time range)   iopamidol 76% (ISOVUE-370) injection for power injector (70 mL Intravenous Given 1/24/24 6531)                MDM       Medical Decision Making      Differential Diagnosis: After obtaining the patient's history, performing the physical exam and reviewing the diagnostics,  multiple initial diagnoses were considered based on the presenting problem including CVA, TIA, central retinal artery occlusion, glaucoma    External document review: I personally reviewed available external medical records for any recent pertinent discharge summaries, testing, and procedures - the findings are as follows: 1/17/24 visit with Dr. Elaine  for medicare annual wellness visit    Complicating Factors: The patient already  has a past medical history of Aortic atherosclerosis ([facility]) (8/5/2021), Cholecystitis (2007), Crohn's disease (HCC), Diabetes ([facility]) (06/2020), Diabetes mellitus with coincident hypertension  ([facility]) (8/5/2021), Glaucoma, Hypercholesterolemia, Osteoarthritis, Stomach ulcer, and Unspecified essential hypertension. to contribute to the complexity of this ED evaluation.    Procedures performed: none    Discussed management with physician/appropriate source: Dr. Harrison - requesting CTA, okay with asa/plavix/dc home with TIA f/u    Considered admission/deescalation of care for: TIA    Social determinants of health affecting patient care: none    Prescription medications considered: plavix, discussed continuing current medication regimen    The patient requires continuous monitoring for: visual disturbance    Shared decision making: discussed possible admission        Disposition and Plan     Clinical Impression:  1. TIA (transient ischemic attack)    2. TIA due to embolism (HCC)        Disposition:  Discharge    Follow-up:  Regino Elaine MD  [address]  [address]  [place] [id number] [phone]    Follow up      Rocky Harrison MD  [address]  [address]  [facility] [id number] [phone]    Follow up      MASOOD Nathrop  [address]  [place] [address]  Call  [phone] choose option 1 for general neurology and state that you are following up for TIA      Medications Prescribed:  Current Discharge Medication List        START taking these  medications    Details   clopidogrel 75 MG Oral Tab Take 1 tablet (75 mg total) by mouth daily.  Qty: 30 tablet, Refills: 0

## 2024-01-24 NOTE — ED INITIAL ASSESSMENT (HPI)
Patient c/o blurred vision on left eye that started this afternoon but has resolved. He also stated he felt faint earlier when the blurred vision occurred but now feels okay. FAST negative. Denies slurred speech, or N/T in any extremities. Symptom started around 230-235pm

## 2024-01-25 ENCOUNTER — OFFICE VISIT (OUTPATIENT)
Dept: NEUROLOGY | Facility: CLINIC | Age: 74
End: 2024-01-25
Payer: MEDICARE

## 2024-01-25 ENCOUNTER — TELEPHONE (OUTPATIENT)
Dept: NEUROLOGY | Facility: CLINIC | Age: 74
End: 2024-01-25

## 2024-01-25 VITALS
SYSTOLIC BLOOD PRESSURE: 160 MMHG | DIASTOLIC BLOOD PRESSURE: 80 MMHG | HEART RATE: 85 BPM | BODY MASS INDEX: 34.46 KG/M2 | HEIGHT: 68.5 IN | WEIGHT: 230 LBS

## 2024-01-25 DIAGNOSIS — G45.9 TIA (TRANSIENT ISCHEMIC ATTACK): Primary | ICD-10-CM

## 2024-01-25 LAB
ATRIAL RATE: 76 BPM
P AXIS: 4 DEGREES
P-R INTERVAL: 142 MS
Q-T INTERVAL: 384 MS
QRS DURATION: 80 MS
QTC CALCULATION (BEZET): 432 MS
R AXIS: -2 DEGREES
T AXIS: 4 DEGREES
VENTRICULAR RATE: 76 BPM

## 2024-01-25 PROCEDURE — 3008F BODY MASS INDEX DOCD: CPT | Performed by: OTHER

## 2024-01-25 PROCEDURE — 1160F RVW MEDS BY RX/DR IN RCRD: CPT | Performed by: OTHER

## 2024-01-25 PROCEDURE — 3079F DIAST BP 80-89 MM HG: CPT | Performed by: OTHER

## 2024-01-25 PROCEDURE — 99205 OFFICE O/P NEW HI 60 MIN: CPT | Performed by: OTHER

## 2024-01-25 PROCEDURE — 3077F SYST BP >= 140 MM HG: CPT | Performed by: OTHER

## 2024-01-25 PROCEDURE — 1159F MED LIST DOCD IN RCRD: CPT | Performed by: OTHER

## 2024-01-25 RX ORDER — LOSARTAN POTASSIUM AND HYDROCHLOROTHIAZIDE 12.5; 1 MG/1; MG/1
1 TABLET ORAL DAILY
COMMUNITY

## 2024-01-25 RX ORDER — ATORVASTATIN CALCIUM 40 MG/1
40 TABLET, FILM COATED ORAL NIGHTLY
COMMUNITY

## 2024-01-25 NOTE — TELEPHONE ENCOUNTER
TIA clinic  ED 01/24/24  Discharged at 2021    Phone call placed to pt. Appointment made for TIA follow up. 2D echo order placed and awaiting appointment time.

## 2024-01-25 NOTE — PROGRESS NOTES
[facility] [place]  [address], [address]  [facility] [id number] [phone]        CEREBROVASCULAR DISEASE  NEUROLOGY CONSULTATION NOTE  Reason for Admission/Consultation: TIA clinic; left superior quadrantanopsia  Requested by: León Fritz  [address] [place] [address]    PCP: Regino Elaine MD  Chief Complaint: TIA (TIA 1/24//24 - blurred vision to the left upper quadrant out of his left eye earlier this afternoon.  Dizziness sensation with that but that only lasted several minutes.  symptoms lasted for approximately 1 hour and resolved spontaneously.  He did have an associated dizziness. No associated chest pain or shortness of breath.  No leg or arm symptoms.The patient was placed on Plavix - 30 ddaysupply)      HPI:  Dylan James is a 73 year oldright handed male w/ a pmhx sig. for  HTN, DM, HLD, obesity, Prior history of smoking, and CAD who presents with over 1 hour of a left superior quadrantanopsia on 1/24/2024 and is now being seen in TIA clinic.      HPI as per the patient, his wife, and chart review.    Lkw: 1/24/24    Began at 2:45 PM and lasted till 4:15 PM    Describes his deficits as \"like looking through the bottom of a glass coca-cola bottle.\"    Once or twice in the last week when gets up to get out of the bathroom his left foot is asleep.  Otherwise no other TIA symptoms or stroke symptoms.  No other focal deficits.  No symptoms concerning for amaurosis fugax.    He called a friend who is a retired physician who recommend he go to the emergency department.  On arrival he was hypertensive to the 180s.  He had a CTA of the head and neck and a 3 sequence MRI of the brain.  His CTA demonstrated luminal irregularities and stenosis in his right vertebral artery.  His 3 sequence MRI of the brain was negative for any DWI hyperintensity.  Patient was already on aspirin and Plavix was added.  He was already on a high intensity statin per Dr. Madden.  He  scheduled for a TTE on February 24, 2024.    He was not aware of the diagnosis of diabetes.  I explained to him that as far as I was aware of the diagnosis was made on his hemoglobin A1c.  His A1c was above 6.5.  Explained the modifiable risk factors/secondary stroke prevention.      His wife states that they are going out of town on February 3.    Prior to admission/presentation taking antithrombotic: Yes   Agent: asa 81mg and started on Plavix after being seen in the ER  Prior to admission/presentation to the ER taking statin: No   Agent: Cholesterol Meds: atorvastatin Tabs - 40 MG; fenofibrate Tabs - 145 MG      Prior to admission, taking antihypertensive: Yes   Agent (s):   Blood Pressure and Cardiac Medications            Losartan Potassium-HCTZ 100-12.5 MG Oral Tab    AMLODIPINE BESYLATE 5 MG Oral Tab    Sildenafil Citrate 100 MG Oral Tab             Review and summation of prior records        Prior stroke/TIAs (date, description):       Date  Topography/symptoms  Etiology      Vascular Risk Factors:   Diabetes mellitus  Dyslipidemia  Hypertension  Male sex at birth  Physical inactivity    ROS:  Pertinent positive and negatives per HPI.  All others were reviewed and negative.    Past Medical History:   Diagnosis Date    Aortic atherosclerosis (HCC) 8/5/2021    Cholecystitis 2007    Crohn's disease (HCC)     Diabetes (HCC) 06/2020    no medications    Diabetes mellitus with coincident hypertension  (HCC) 8/5/2021    Glaucoma     Hypercholesterolemia     Osteoarthritis     Stomach ulcer     Unspecified essential hypertension        Past Surgical History:   Procedure Laterality Date    CABG  2001    CHOLECYSTECTOMY  2008    COLONOSCOPY  09/2018    normal    COLONOSCOPY N/A 9/14/2018    Procedure: COLONOSCOPY, POSSIBLE BIOPSY, POSSIBLE POLYPECTOMY 10287;  Surgeon: Cruz Chow MD;  [facility]: [facility]    HIP SURGERY Left     Left SANJUANITA    OTHER SURGICAL HISTORY  2019    repair of anal fistula        Family History   Problem Relation Age of Onset    Heart Disease Father     Heart Disease Mother     Breast Cancer Mother     Diabetes Maternal Grandmother        Social History     Socioeconomic History    Marital status:      Spouse name: Not on file    Number of children: Not on file    Years of education: Not on file    Highest education level: Not on file   Occupational History    Not on file   Tobacco Use    Smoking status: Former     Types: Cigarettes     Quit date: 2001     Years since quittin.0    Smokeless tobacco: Never   Vaping Use    Vaping Use: Never used   Substance and Sexual Activity    Alcohol use: Yes     Comment: occasional beer.    Drug use: No    Sexual activity: Not on file   Other Topics Concern     Service Not Asked    Blood Transfusions Not Asked    Caffeine Concern Yes     Comment: coffee, 2 cups/day    Occupational Exposure Not Asked    Hobby Hazards Not Asked    Sleep Concern Not Asked    Stress Concern Not Asked    Weight Concern Not Asked    Special Diet Not Asked    Back Care Not Asked    Exercise Not Asked    Bike Helmet Not Asked    Seat Belt Not Asked    Self-Exams Not Asked   Social History Narrative    Not on file     Social Determinants of Health     Financial Resource Strain: Not on file   Food Insecurity: Not on file   Transportation Needs: Not on file   Physical Activity: Not on file   Stress: Not on file   Social Connections: Not on file   Housing Stability: Not on file       Outpatient Medications  Current Outpatient Medications   Medication Instructions    acetaminophen 500 MG Oral Tab Take 2 tablets (1000 mg total) by mouth every 8 (eight) hours as needed for pain.    AMLODIPINE BESYLATE 5 MG Oral Tab TAKE 1 TABLET(5 MG) BY MOUTH DAILY    aspirin (ASPIRIN EC LOW DOSE) 81 MG Oral Tab EC Take 1 tablet (84 MG) twice daily with food for 6 weeks.    atorvastatin (LIPITOR) 40 mg, Oral, Nightly    clopidogrel (PLAVIX) 75 mg, Oral, Daily     fenofibrate (TRICOR) 145 mg, Oral, Daily    latanoprost 0.005 % Ophthalmic Solution 1 drop, Both Eyes, Nightly    Losartan Potassium-HCTZ 100-12.5 MG Oral Tab 1 tablet, Oral, Daily    pantoprazole (PROTONIX) 40 mg, Oral, Before breakfast    Sildenafil Citrate (VIAGRA) 100 mg, Oral, Daily as needed      Objective:    Last vitals and weight :  Vitals:    01/25/24 1223   BP: 160/80   Pulse: 85       Exam:  - General: appears stated age and no distress  - CV: Normal S1-S2   Carotids: No carotid bruits.  - Pulmonary: no signs of respiratory distress. Normal excursion of the chest.   Neurologic Exam  - Mental Status: Alert and attentive. Oriented to person, place, time/date, situation, day of week, month of year, and year.  Speech is spontaneous, fluent, and prosodic. Comprehension and repetition intact. Phrase length and rate are normal. No paraphasic errors, neologisms, anomia, acalculia, apraxia, anosognosia, or R/L confusion. No neglect.   - Cranial Nerves: No gaze preference. Visual fields:normal  Pupils are equally round and reactive to light in a well room.. EOMI. No nystagmus. No ptosis. V1-V3 intact B/L to light touch.No pathological facial asymmetry. No flattening of the nasolabial fold. .  Hearing grossly intact.  Tongue midline. No atrophy or fasiculations of the tongue noted. Palate and uvula elevate symmetrically.  Shoulder shrug symmetric.  - Fundoscopic exam:normal w/o hemorrhages, exudates, or papilledema.+ attenuation. No pallor.  - Motor:  normal tone, normal bulk. No interosseous wasting. No flattening of hypothenar eminences.       Right Left     Motor Strength   Deltoids 5 5  Triceps 5 5  Biceps 5 5  Wrist Extensors 5 5   5 5   Hip Flexors 5 5   Knee extensors 5 5  Knee flexors 5 5        Pronator drift: No pronator drift   Arm Rolling: No orbiting.   Finger Taps: Finger taps are symmetric in rate and amplitude.    Rapid movements: Rapid/fine movements are symmetric. As expected their dominant  hand is slightly faster.   Leg Drift: None   Foot Taps: Foot taps are symmetric.      Asterixis: No asterixis noted.   Tremor:      Reflexes:    C5 C6 C7  L4 S1   R 2+ 2+  2+ 2+   L 2+ 2+  2+ 2+   Adductor Spread: No adductor spread noted.    Frontal release signs:Not assessed.    Jaw Jerk:    Kentrell's sign:absent   Nonsustained clonus: Absent   Sustained clonus: Absent   - Sensory:   Light touch: normal  Temperature: normal  Pinprick:   Vibration: normal  - Cerebellum: No truncal ataxia. No titubations. No dysmetria, no dysdiadochokinesis. No overshoot.   - Gait/station: Normal gait and station. Symmetric arm swing.   - Plantar response: flexor bilaterally    NIH Stroke Scale  Person Administering Scale: Dimitri Brothers DO  1a  Level of consciousness: 0 = Alert keenly responsive    1b. LOC questions:  0 = Answers both questions correctly     1c. LOC commands: 0 = Performs both tasks correctly    2.  Best Gaze: 0 = Normal    3.  Visual: 0 = No visual loss     4. Facial Palsy: 0 = Normal symmetrical movement     5a.  Motor left arm: 0 = No drift; limb holds 90º(or 45º) for full 10 seconds   5b.  Motor right arm: 0 = No drift; limb holds 90º(or 45º) for full 10 seconds   6a. motor left le = No drift; leg holds 30º for full 5 seconds     6b  Motor right le = No drift; leg holds 30º for full 5 seconds     7. Limb Ataxia: 0 = Absent     8.  Sensory: 0 = Normal, no sensory loss     9. Best Language:  0 = No aphasia, normal      10. Dysarthria: 0 = Normal articulation   11. Extinction and Inattention: 0 = No abnormality     Total:   0     Modified [place] Score: 0 - No symptoms.                               Data reviewed    ECG findings by monitor or 12-lead:  Ecg:   Results for orders placed or performed during the hospital encounter of 24   EKG   Result Value Ref Range    Ventricular rate 76 BPM    Atrial rate 76 BPM    P-R Interval 142 ms    QRS Duration 80 ms    Q-T Interval 384 ms    QTC Calculation  (Bezet) 432 ms    P Axis 4 degrees    R Axis -2 degrees    T Axis 4 degrees       Test results/Imaging:     Lab Results   Component Value Date/Time    TRIG 125.00 2022 08:36 AM    HDL 38 (L) 2022 08:36 AM    LDL 59 2022 08:36 AM    CHOLHDL 3 2022 08:36 AM     @LABRCNTIP(wbc:3,hgb:3,hct:3,plt:3)@  @LABRCNTIP(NA:3,K:3,CL:3,co2:3,bun:3,creatinine:3,calcium:3,labalbu:3,prot:3,bilit:3,alkphos:3,alt:3,ast:3,gluc:3)@  Lab Results   Component Value Date    A1C 6.1 (H) 2022    A1C 6.1 (H) 2021    A1C 6.5 (H) 2021       Last A1c value was 6.1% done 2022.   Most recent was 6.9          No valid procedures specified.  No valid procedures specified.  No valid procedures specified.  No valid procedures specified.  No valid procedures specified.  No valid procedures specified.  No valid procedures specified.   CTA BRAIN + CTA CAROTIDS (CPT=70496/49745)    Result Date: 2024  CONCLUSION:    No proximal large artery occlusion in the brain  Multifocal occlusion and or stenosis in the right vertebral artery, age indeterminate  Less than 50 percent bilateral ICA stenosis due to calcified plaque   Dictated by (CST): Cecil Mg MD on 2024 at 7:04 PM     Finalized by (CST): Cecil gM MD on 2024 at 7:12 PM           Performed an independent visualization of: MRI brain WO and CTA head/neck    Imaging revealed: Agree with radiology read.         ABCD3I SCORE   Risk factors    Age Age greater than or equal to 60: 1    Blood pressure Blood pressure greater than or 140/90 mmH   Clinical features No speech disturbance or weakness: 0   Duration Greater than equal to 60 minutes: 2   Presence of diabetes mellitus  Diabetes present: 1   second TIA in < 7 days   No:0   Imaging    Ipsilateral greater than or equal to 50% stenosis of ICA and/or cerebral major artery Ipsilateral greater than equal to 50% stenosis of internal carotid artery and/or cerebral major artery - No: 0    Acute DWI hyperintensity Imaging: acute MRI-DWI hyperintensity- no: 0   Total    0-3: Low risk  4-7 moderate risk  8-13 high risk 5         73 year old male w/ a pmhx sig. for HTN, HLD, DM, and remote history of smoking who presents for approximately 90 minutes of a MONOCULAR left superior quadrantanopsia, and reportedly dizziness.  The patient did not endorse dizziness.  He reported having blurred vision in his left superior quadrant.  The visual abnormality was not homonymous.  He states that he closed one eye and the other and there was no vision abnormalities in his right eye.    His symptoms were localized to his right inferior optic radiation in his temporal lobe.  However, he denies having homonymous symptoms.  This would localize to his retina.  It is possible that he had a branch retinal artery occlusion.  I do not see any boxcarring on his funduscopic exam.  He does have attenuation.  He needs to see ophthalmology as soon as possible.  He needs his TTE in the next 48 hours.  Will call Dr. Madden at [facility] to see if they can get him in earlier.  He does not have obvious signs of intracranial atherosclerosis.  He may not need to be on dual antiplatelets.  He should remain on Plavix in addition to aspirin for no longer than 21 days.    90 minutes of a monocular left superior quadrant defect  Differential Diagnosis:  Left branch retinal artery occlusion  Right temporal lobe TIA; only possible if his symptoms were homonymous          Diagnostics: Will call Dr. Madden and ask him if [facility] can do an echo in clinic tomorrow.  Cont antithrombotics, aspirin 81 mg.  Okay to continue Plavix for 21 days.  And Cholesterol Meds: atorvastatin Tabs - 40 MG; fenofibrate Tabs - 145 MG his LDL is below goal but we will continue him on atorvastatin 40.  He should follow-up with his PCP regarding his diagnosis of diabetes.  The patient states he was unaware that he has diabetes.  Home BP monitoring. Pt  instructed to check BP at home in the AM and PM, and bring values to future clinic visits. BP goal is for normotension, < 130/80.  HbA1c goal <7.0  LDL-C goal  <70 mg/dL.   Frequent exercise as per AHA/ASA recs (30 min of aerobic exercise, 5 times per week).  If snoring, consider referral for sleep study for possible SIXTO  Recommend DASH diet. A diet that is rich in fruits and vegetables and thereby high in potassium is beneficial. Recommend reduced intake of sodium and increased intake of potassium, increase consumption of fruits, vegetables, and low-fat dairy products and  decreased consumption of saturated fat.        Education/Instructions given to: patient   Barriers to Learning:None  Content: Refer to note above.  Also provided education to the patient on recurrent stroke signs and symptoms, including but not limited to a facial droop, dysarthria, difficulty talking, word finding difficulties, weakness, falls, change in sensation. Pt should call 911 or be taken to the nearest emergency department if sx occur.  Evaluation/Outcome: Verbalized understanding    This document is not intended to support charting by exception.  Sections left blank in a completed note should be presumed not to have been done.    Total time on the date of service including time spent reviewing the prior records, reviewing the notes, writing the note, reviewing the imaging, and face to face time was 60 minutes, more than 50% of the time was spent in counseling and/or coordination of care related to TIA/branch retinal artery occlusion.    Disclaimer:   This record was dictated using Dragon software. There may be errors due to voice recognition problems that were not realized and corrected during the completion of the note.         Thank you.  Dimitri Brothers DO   [facility]

## 2024-01-25 NOTE — PATIENT INSTRUCTIONS
A Stroke is when part of your brain does not get blood because of blockage in an artery, leading to that part of the brain dying.  This is known as an ischemic stroke, because the brain does not get enough blood.    A TIA or transient ischemic attack is similar, however the blockage is reversed and no portion of the brain dies.  There are no signs of lasting damage on an MRI or a CT scan.  The diagnosis of TIA is made based on the history alone.    A TIA is often a warning sign that person is about to have a stroke.      To lower your risk of another stroke/TIA, you need to take your  ASA 81mg daily as previously prescribed.     If for any reason you have difficulty taking the medication as directed, please call our office and explain your difficulty. Do not stop taking your medications until you have been instructed to do so as this can increase your stroke risk.    Risk factors that can be controlled with medications and lifestyle changes include:     High blood pressure (hypertension): It is recommended that your blood pressure be less than  130/80 to lower your risk of stroke. Check your blood pressure twice a day.  Once in the morning, right when you wake up and before you take your morning medications.  Check it again once in the evening after you have taken any evening medications.  Write down these values for 2 weeks.  Bring them to your primary care physician and your next stroke follow-up visit.    High cholesterol: It is recommended that your low-density lipoprotein cholesterol (LDL-C) should be less than 70.     You have been prescribed  Atorvastatin 40 mg to be taken at bedtime to manage your cholesterol.     Diabetes: To prevent further strokes, your fasting blood sugar should be , and your HbA1c should be less than 6.5.     Smoking: It is recommended that you ABSTAIN from smoking. If necessary, there are resources available to assist you with smoking cessation. If you smoke or use tobacco  products, discuss alternatives with your doctor.  [place] Tobacco Quit Line: 1-790.204.8594  [place] Tobacco Quit Line: 1-919.812.2118     Diet: We recommend the Mediterranean/DASH diet -- high in fresh fruits (apples, blueberries) and vegetables (dark green such as spinach, kale). Fish and nuts (walnuts, almonds), olive oil or canola oil. Reduced red meat, cheese/dairy, and eggs. Also recommended is moderate sodium intake.    Sedentary lifestyle: Try to engage in routine exercise (3-5 sessions of aerobic exercise per week, 30 minutes per session). Talk with your primary care physician about what type of exercise might be best for you.    Snoring: If snoring, consider referral for possible obstructive sleep apnea (SIXTO).  Treating obstructive sleep apnea will decrease your risk of developing dementia, can lower your blood pressure, will improve your energy levels, may treat headache, and may decrease your risk of stroke.    Stroke symptoms include the following, and 911 should be called immediately if you experience any of these:       B.E. F.A.S.T.    B: Balance-- sudden loss  of balance, staggering gait, severe vertigo        E: Eyes-- sudden loss of vision in one or both eyes, onset of double vision        F: Face-- uneven or drooping face, drooling, ask the patient to smile        A: Arm (leg)-- loss of strength or sensation on one side of the body in the arm and/or leg        S: Speech-- slurring of speech, difficulty  saying words or understanding what is being said, sudden confusion        T: Terrible headache (time*)-- very severe headache which has maximum intensity within seconds to a minute        * Time of symptom onset and last known well times are important when determining what treatment is appropriate for an individual's stroke, particularly since treatment is time limited.  Time is not a symptom or sign of stroke.  Traditionally, Time was used for the \"T\" in the FAST and BEFAST acronyms for stroke  awareness. Since terrible headache can be a symptom of stroke this is substituted.  However, as the acronym B.E. F.A.S.T. suggests, acting quickly is of critical importance after stroke is suspected. Knowing the symptom onset time or time when last well will have an impact on what treatments can be offered safely.         NELSON.SHAHRIAR. F.A.S.T.    B: Balance-- sudden loss  of balance, staggering gait, severe vertigo        E: Eyes-- sudden loss of vision in one or both eyes, onset of double vision        F: Face-- uneven or drooping face, drooling, ask the patient to smile        A: Arm (leg)-- loss of strength or sensation on one side of the body in the arm and/or leg        S: Speech-- slurring of speech, difficulty  saying words or understanding what is being said, sudden confusion        T: Terrible headache (time*)-- very severe headache which has maximum intensity within seconds to a minute        * Time of symptom onset and last known well times are important when determining what treatment is appropriate for an individual's stroke, particularly since treatment is time limited.  Time is not a symptom or sign of stroke.  Traditionally, Time was used for the \"T\" in the FAST and BEFAST acronyms for stroke awareness. Since terrible headache can be a symptom of stroke this is substituted.  However, as the acronym B.E. F.A.S.T. suggests, acting quickly is of critical importance after stroke is suspected. Knowing the symptom onset time or time when last well will have an impact on what treatments can be offered safely.

## 2024-01-30 ENCOUNTER — LAB ENCOUNTER (OUTPATIENT)
Dept: LAB | Facility: HOSPITAL | Age: 74
End: 2024-01-30
Attending: INTERNAL MEDICINE
Payer: MEDICARE

## 2024-01-30 DIAGNOSIS — I25.10 CORONARY ATHEROSCLEROSIS OF NATIVE CORONARY ARTERY: Primary | ICD-10-CM

## 2024-01-30 LAB
BASOPHILS # BLD AUTO: 0.09 X10(3) UL (ref 0–0.2)
BASOPHILS NFR BLD AUTO: 1.1 %
DEPRECATED RDW RBC AUTO: 38.6 FL (ref 35.1–46.3)
EOSINOPHIL # BLD AUTO: 0.15 X10(3) UL (ref 0–0.7)
EOSINOPHIL NFR BLD AUTO: 1.8 %
ERYTHROCYTE [DISTWIDTH] IN BLOOD BY AUTOMATED COUNT: 11.9 % (ref 11–15)
HCT VFR BLD AUTO: 42.8 %
HGB BLD-MCNC: 15.2 G/DL
IMM GRANULOCYTES # BLD AUTO: 0.02 X10(3) UL (ref 0–1)
IMM GRANULOCYTES NFR BLD: 0.2 %
LYMPHOCYTES # BLD AUTO: 2.08 X10(3) UL (ref 1–4)
LYMPHOCYTES NFR BLD AUTO: 25.3 %
MCH RBC QN AUTO: 31.5 PG (ref 26–34)
MCHC RBC AUTO-ENTMCNC: 35.5 G/DL (ref 31–37)
MCV RBC AUTO: 88.6 FL
MONOCYTES # BLD AUTO: 0.71 X10(3) UL (ref 0.1–1)
MONOCYTES NFR BLD AUTO: 8.6 %
NEUTROPHILS # BLD AUTO: 5.18 X10 (3) UL (ref 1.5–7.7)
NEUTROPHILS # BLD AUTO: 5.18 X10(3) UL (ref 1.5–7.7)
NEUTROPHILS NFR BLD AUTO: 63 %
PLATELET # BLD AUTO: 246 10(3)UL (ref 150–450)
RBC # BLD AUTO: 4.83 X10(6)UL
WBC # BLD AUTO: 8.2 X10(3) UL (ref 4–11)

## 2024-01-30 PROCEDURE — 36415 COLL VENOUS BLD VENIPUNCTURE: CPT

## 2024-01-30 PROCEDURE — 85025 COMPLETE CBC W/AUTO DIFF WBC: CPT

## 2024-02-02 ENCOUNTER — HOSPITAL ENCOUNTER (OUTPATIENT)
Dept: INTERVENTIONAL RADIOLOGY/VASCULAR | Facility: HOSPITAL | Age: 74
Discharge: HOME | End: 2024-02-02
Attending: INTERNAL MEDICINE | Admitting: STUDENT IN AN ORGANIZED HEALTH CARE EDUCATION/TRAINING PROGRAM
Payer: MEDICARE

## 2024-02-02 ENCOUNTER — HOSPITAL ENCOUNTER (OUTPATIENT)
Dept: CV DIAGNOSTICS | Facility: HOSPITAL | Age: 74
Discharge: HOME | End: 2024-02-02
Attending: INTERNAL MEDICINE
Payer: MEDICARE

## 2024-02-02 VITALS
SYSTOLIC BLOOD PRESSURE: 123 MMHG | RESPIRATION RATE: 14 BRPM | DIASTOLIC BLOOD PRESSURE: 62 MMHG | BODY MASS INDEX: 33.5 KG/M2 | WEIGHT: 234 LBS | HEIGHT: 70 IN | HEART RATE: 67 BPM | OXYGEN SATURATION: 98 %

## 2024-02-02 DIAGNOSIS — G45.9 TIA (TRANSIENT ISCHEMIC ATTACK): ICD-10-CM

## 2024-02-02 PROCEDURE — 36415 COLL VENOUS BLD VENIPUNCTURE: CPT

## 2024-02-02 PROCEDURE — 93325 DOPPLER ECHO COLOR FLOW MAPG: CPT | Performed by: INTERNAL MEDICINE

## 2024-02-02 PROCEDURE — 99152 MOD SED SAME PHYS/QHP 5/>YRS: CPT | Performed by: STUDENT IN AN ORGANIZED HEALTH CARE EDUCATION/TRAINING PROGRAM

## 2024-02-02 PROCEDURE — B24CZZ4 ULTRASONOGRAPHY OF PERICARDIUM, TRANSESOPHAGEAL: ICD-10-PCS | Performed by: INTERNAL MEDICINE

## 2024-02-02 PROCEDURE — 4A02X9Z MEASUREMENT OF CARDIAC OUTPUT, EXTERNAL APPROACH: ICD-10-PCS | Performed by: INTERNAL MEDICINE

## 2024-02-02 PROCEDURE — 93312 ECHO TRANSESOPHAGEAL: CPT | Performed by: STUDENT IN AN ORGANIZED HEALTH CARE EDUCATION/TRAINING PROGRAM

## 2024-02-02 PROCEDURE — B24BZZ4 ULTRASONOGRAPHY OF HEART WITH AORTA, TRANSESOPHAGEAL: ICD-10-PCS | Performed by: INTERNAL MEDICINE

## 2024-02-02 PROCEDURE — 93320 DOPPLER ECHO COMPLETE: CPT | Performed by: INTERNAL MEDICINE

## 2024-02-02 RX ORDER — MIDAZOLAM HYDROCHLORIDE 1 MG/ML
INJECTION INTRAMUSCULAR; INTRAVENOUS
Status: COMPLETED
Start: 2024-02-02 | End: 2024-02-02

## 2024-02-02 RX ORDER — MIDAZOLAM HYDROCHLORIDE 1 MG/ML
4 INJECTION INTRAMUSCULAR; INTRAVENOUS ONCE
Status: COMPLETED | OUTPATIENT
Start: 2024-02-02 | End: 2024-02-02

## 2024-02-02 RX ORDER — SODIUM CHLORIDE 0.9 % (FLUSH) 0.9 %
10 SYRINGE (ML) INJECTION AS NEEDED
Status: DISCONTINUED | OUTPATIENT
Start: 2024-02-02 | End: 2024-02-02

## 2024-02-02 RX ORDER — SODIUM CHLORIDE 9 MG/ML
INJECTION, SOLUTION INTRAVENOUS
Status: COMPLETED | OUTPATIENT
Start: 2024-02-02 | End: 2024-02-02

## 2024-02-02 RX ADMIN — MIDAZOLAM HYDROCHLORIDE 4 MG: 1 INJECTION INTRAMUSCULAR; INTRAVENOUS at 08:30:00

## 2024-02-02 RX ADMIN — SODIUM CHLORIDE: 9 INJECTION, SOLUTION INTRAVENOUS at 07:30:00

## 2024-02-02 NOTE — IVS NOTE
DISCHARGE NOTE     Pt is able to sit up and ambulate without difficulty.   Pt voided and tolerated fluids.  Procedural site remains dry and intact.   IV access removed.  Instruction provided, patient/family verbalizes understanding.   Dr. Hill spoke with patient/family post procedure.     Pt discharge via wheelchair to [facility].    Follow up Appointment: as scheduled    New Prescription: none

## 2024-02-02 NOTE — DISCHARGE INSTRUCTIONS
HOME CARE INSTRUCTIONS FOLLOWING  TRANSESOPHAGEAL ECHOCARDIOGRAPHY  (JULIUS)    Activity    DO NOT drive after the procedure. You may resume driving the following day    Do not operate any machinery (including kitchen appliances or power tools)    Avoid drinking alcohol for 24 hours    You may resume your normal activities tomorrow    Sip cool liquids initially and advance to a soft diet tonight    What is normal    You may experience a sore throat for 2 to 3 days following the examination    Gargling with warm salt water (1/2 teaspoon of salt to 8 oz. of warm water) or using throat lozenges will help to soothe your throat    When you should NOTIFY YOUR PHYSICIAN     If you experience sharp pain in your neck, abdomen, or chest     If you have sudden nausea and/or vomiting     If you vomit blood     If you have a fever greater than 100 degrees    Other     You may resume your present diet, unless otherwise specified by your physician     You may resume all of your medications as prescribed, unless otherwise directed by your physician. A list of your medications was provided to you at discharge     Please call your physician’s office for a follow-up appointment. You should be seen in 2 weeks

## 2024-02-26 ENCOUNTER — OFFICE VISIT (OUTPATIENT)
Dept: NEUROLOGY | Facility: CLINIC | Age: 74
End: 2024-02-26
Payer: MEDICARE

## 2024-02-26 VITALS
BODY MASS INDEX: 33.5 KG/M2 | SYSTOLIC BLOOD PRESSURE: 150 MMHG | HEIGHT: 70 IN | DIASTOLIC BLOOD PRESSURE: 73 MMHG | HEART RATE: 93 BPM | WEIGHT: 234 LBS

## 2024-02-26 DIAGNOSIS — G45.9 TIA (TRANSIENT ISCHEMIC ATTACK): Primary | ICD-10-CM

## 2024-02-26 PROCEDURE — 1159F MED LIST DOCD IN RCRD: CPT | Performed by: OTHER

## 2024-02-26 PROCEDURE — 99214 OFFICE O/P EST MOD 30 MIN: CPT | Performed by: OTHER

## 2024-02-26 PROCEDURE — 3077F SYST BP >= 140 MM HG: CPT | Performed by: OTHER

## 2024-02-26 PROCEDURE — 3008F BODY MASS INDEX DOCD: CPT | Performed by: OTHER

## 2024-02-26 PROCEDURE — 1160F RVW MEDS BY RX/DR IN RCRD: CPT | Performed by: OTHER

## 2024-02-26 PROCEDURE — 3078F DIAST BP <80 MM HG: CPT | Performed by: OTHER

## 2024-02-26 NOTE — PATIENT INSTRUCTIONS
NELSON.SHAHRIAR. JOELLEN.A.S.T.    B: Balance-- sudden loss  of balance, staggering gait, severe vertigo        E: Eyes-- sudden loss of vision in one or both eyes, onset of double vision        F: Face-- uneven or drooping face, drooling, ask the patient to smile        A: Arm (leg)-- loss of strength or sensation on one side of the body in the arm and/or leg        S: Speech-- slurring of speech, difficulty  saying words or understanding what is being said, sudden confusion        T: Terrible headache (time*)-- very severe headache which has maximum intensity within seconds to a minute        * Time of symptom onset and last known well times are important when determining what treatment is appropriate for an individual's stroke, particularly since treatment is time limited.  Time is not a symptom or sign of stroke.  Traditionally, Time was used for the \"T\" in the FAST and BEFAST acronyms for stroke awareness. Since terrible headache can be a symptom of stroke this is substituted.  However, as the acronym B.E. F.A.S.T. suggests, acting quickly is of critical importance after stroke is suspected. Knowing the symptom onset time or time when last well will have an impact on what treatments can be offered safely.

## 2024-02-26 NOTE — PROGRESS NOTES
[facility] [address], [address]  [facility] [address]  [phone]        Neurology Clinic Follow Up Note    Chief Complaint:  TIA (LOV 1/25/24 - The pt presents as a routine follow up for hx of TIA. Pt had echocardiogram, JULIUS, audio, and ophthalmology completed./Patient would like to discuss taking the Plavix and asa at the same time.)      HPI:   Dylan James is a 73 year old  man   w/ a pmhx sig. for  HTN, DM, HLD, obesity, prior history of smoking, and CAD who presents in follow up with over 1 hour of a left superior quadrantanopsia on 1/24/2024 w/ concern for TIA.  He has completed the TIA/stroke workup including a JULIUS.  She was found to have diabetes with an A1c of 6.9.  His LDL is 59.  He is going to go for sleep study.  He has not had any interval events concerning for TIA or stroke.    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 All questions answered. Patient verbalized understanding of all issues and instructions  Will coordinate care with primary medical doctor  Electronically signed by Garcia Bates MD at 01/26/2024 4:30 PM  CST \"    02/26/24 Interval History/Subjective :   He is going for a sleep study after he goes to [place] for 3 weeks.  He saw Dr. Madden; Dr. Madden switch his amlodipine to 5 in the morning and 5 in the evening.  He wants him to have a sleep study.  He is watching what he eats.  He is avoiding white bread and pasta  He had a JULIUS, which was normal.  No interval symptoms concerning for TIA/stroke.  Discussed benefits of dual antiplatelets for 21 days.  After 21 days he should be on aspirin monotherapy.  Most likely mechanism of his TIA was due to artery to artery embolism.  Explained that if the etiology was cardioembolic one would expect to see intracranial emboli/infarcts or symptoms with intracranial involvement.  Likelihood that he would have pure retinal symptoms from a cardiac source is lower.    ROS: Pertinent positive and negatives per HPI.  All others were reviewed and negative.     Medications:  Current Outpatient Medications   Medication Instructions    acetaminophen 500 MG Oral Tab Take 2 tablets (1000 mg total) by mouth every 8 (eight) hours as needed for pain.    AMLODIPINE BESYLATE 5 MG Oral Tab TAKE 1 TABLET(5 MG) BY MOUTH DAILY    aspirin (ASPIRIN EC LOW DOSE) 81 MG Oral Tab EC Take 1 tablet (84 MG) twice daily with food for 6 weeks.    atorvastatin (LIPITOR) 40 mg, Oral, Nightly    clopidogrel (PLAVIX) 75 mg, Oral, Daily    fenofibrate (TRICOR) 145 mg, Oral, Daily    latanoprost 0.005 % Ophthalmic Solution 1 drop, Both Eyes, Nightly    Losartan Potassium-HCTZ 100-12.5 MG Oral Tab 1 tablet, Oral, Daily    pantoprazole (PROTONIX) 40 mg, Oral, Before breakfast    Sildenafil Citrate (VIAGRA) 100 mg, Oral, Daily as needed    He is taking 5 mg in the morning and 5 mg in the evening.    Reviewed and assessed      Objective:  Last vitals and weight :  Body mass index is 33.58 kg/m².   There were no vitals filed for this visit.   Pulse 93, height 70\", weight 234 lb (106.1 kg).  Pulse 93   Ht 70\"   Wt 234 lb  (106.1 kg)   BMI 33.58 kg/m²   Exam:     - Pulmonary: Normal excursion of the chest.  No signs of respiratory distress.  Neurologic Exam  - Mental Status: Alert and attentive.  Oriented x 4..  Speech is spontaneous, fluent, and prosodic. Comprehension and repetition intact. Phrase length and rate are normal. No paraphasic errors, neologisms, anomia, acalculia, apraxia, anosognosia, or R/L confusion.   - Cranial Nerves: No gaze preference. Visual fields:normal  EOMI. No nystagmus. No ptosis. V1-V3 intact B/L to light touch.No pathological facial asymmetry. No flattening of the nasolabial fold. .  Hearing grossly intact.  Tongue midline. No atrophy or fasiculations of the tongue noted. Palate and uvula elevate symmetrically.  Shoulder shrug symmetric.    - Motor:  normal tone/bulk. No interosseous wasting. No flattening of hypothenar eminences.   Motor Strength    Pronator drift: No pronator drift   Arm Rolling: No orbiting.   Finger Taps: Finger taps are symmetric in rate and amplitude.    Rapid movements: symmetric. No fatiguing.   Right Left     Motor Strength    Hip Flexors 5 5      Asterixis: No asterixis noted.   - Sensory:   Light touch: normal    - Cerebellum: No truncal ataxia. No titubations. No dysmetria, no dysdiadochokinesis. No rebound.       Exam is unchanged on 02/26/24    Most recent lab results:    Diabetes:    Component  Ref Range & Units 1/18/24  8:12 AM   HbA1c  4.0 - 5.6 % 6.9 High            Calculated LDL  <=130 mg/dL 58     Lab Results   Component Value Date    A1C 6.1 (H) 01/31/2022    A1C 6.1 (H) 08/06/2021    A1C 6.5 (H) 01/04/2021     01/31/2022     08/06/2021     01/04/2021     Lab Results   Component Value Date    CHOLEST 122.00 01/31/2022    CHOLEST 99.00 08/06/2021    CHOLEST 125.00 10/01/2020     Lab Results   Component Value Date    HDL 38 (L) 01/31/2022    HDL 32 (L) 08/06/2021    HDL 33 (L) 10/01/2020     Lab Results   Component Value Date    LDL 59 01/31/2022     LDL 43 08/06/2021    LDL 62 10/01/2020     Lab Results   Component Value Date    TRIG 125.00 01/31/2022    TRIG 118.00 08/06/2021    TRIG 148.00 10/01/2020    TRIGLY 125 09/07/2013    TRIGLY 190 (H) 03/22/2013    TRIGLY 146 08/31/2012     Lab Results   Component Value Date    AST 37 10/01/2020    AST 33 05/26/2020    AST 34 09/05/2019     Lab Results   Component Value Date    ALT 27 10/01/2020    ALT 32 05/26/2020    ALT 34 09/05/2019         Diagnostic studies:  Performed an independent visualization of  mri brain, cta head and neck   Imaging revealed:   agree w/ read    Assessment   Discussed benefits of dual antiplatelets for 21 days.  After 21 days he should be on aspirin monotherapy.  Most likely mechanism of his TIA was due to artery to artery embolism.  Explained that if the etiology was cardioembolic one would expect to see intracranial emboli/infarcts or symptoms with intracranial involvement.  Likelihood that he would have pure retinal symptoms from a cardiac source is lower.       Plan   1.visual disturbance left eye/1 hour left superior quadrantanopsia on 1/24/2024  Diagnostics:  Sleep study  Therapeutics  Cont antithrombotics, ASA  81 mg and high intensity statin, Cholesterol Meds: atorvastatin Tabs - 40 MG; fenofibrate Tabs - 145 MG   Dual antiplatelets only for 21 days then aspirin monotherapy.  Home BP monitoring. Pt instructed to check BP at home in the AM and PM, and bring values to future clinic visits. BP goal is for normotension, < 130/80.  HbA1c goal <7.0  LDL-C goal  <70 mg/dL.   Frequent exercise as per AHA/ASA recs (30 min of aerobic exercise, 5 times per week).  If snoring, consider referral for sleep study for possible SIXTO.  Recommend DASH diet. A diet that is rich in fruits and vegetables and thereby high in potassium is beneficial. Recommend reduced intake of sodium and increased intake of potassium, increase consumption of fruits, vegetables, and low-fat dairy products and  decreased  consumption of saturated fat.      Could consider cutting his Atorvastatin dose in half to 20mg at bedtime ; statin do increase pt's A1c and it could help lower his A1c.  Either way his LDL and A1c are below goal  Follow up w/ neurology as needed.                       This document is not intended to support charting by exception.  Sections left blank in a completed note should be presumed not to have been done.      Disclaimer:   This record was dictated using  Dragon software. There may be errors due to voice recognition problems that were not realized and corrected during the completion of the note.      I spent 30 minutes counseling the patient regarding the findings of investigations, treatment options, risks and benefits of treatment, and management of care regarding the diagnosis above.        Thank you for allowing me to participate in the care of your patient.    Dimitri Brothers DO  2/26/2024

## (undated) DEVICE — DRESSING AQUACEL AG 3.5 X 10

## (undated) DEVICE — SOL  .9 1000ML BTL

## (undated) DEVICE — DRAPE SRG 26X15IN UTL TPE STRL

## (undated) DEVICE — CONTAINER SPEC STR 4OZ GRY LID

## (undated) DEVICE — FAN SPRAY KIT: Brand: PULSAVAC®

## (undated) DEVICE — VIOLET BRAIDED (POLYGLACTIN 910), SYNTHETIC ABSORBABLE SUTURE: Brand: COATED VICRYL

## (undated) DEVICE — GAUZE SPONGES,12 PLY: Brand: CURITY

## (undated) DEVICE — PHOTONSABER Y METAL TIP

## (undated) DEVICE — DEV STRATAFIX PDS + SUTR 0 CTX

## (undated) DEVICE — SOL  .9 1000ML BAG

## (undated) DEVICE — 450 ML BOTTLE OF 0.05% CHLORHEXIDINE GLUCONATE IN 99.95% STERILE WATER FOR IRRIGATION, USP AND APPLICATOR.: Brand: IRRISEPT ANTIMICROBIAL WOUND LAVAGE

## (undated) DEVICE — BATTERY

## (undated) DEVICE — Device: Brand: STABLECUT®

## (undated) DEVICE — WOUND RETRACTOR AND PROTECTOR: Brand: ALEXIS O WOUND PROTECTOR-RETRACTOR

## (undated) DEVICE — GOWN SURG AERO BLUE PERF XLG

## (undated) DEVICE — T5 HOOD WITH PEEL AWAY FACE SHIELD

## (undated) DEVICE — GOWN SURGICAL MICROCOOL XL LNG

## (undated) DEVICE — GAMMEX® PI HYBRID SIZE 9, STERILE POWDER-FREE SURGICAL GLOVE, POLYISOPRENE AND NEOPRENE BLEND: Brand: GAMMEX

## (undated) DEVICE — WEBRIL COTTON UNDERCAST PADDING: Brand: WEBRIL

## (undated) DEVICE — BIPOLAR SEALER 23-112-1 AQM 6.0: Brand: AQUAMANTYS™

## (undated) DEVICE — 3M™ STERI-STRIP™ REINFORCED ADHESIVE SKIN CLOSURES, R1547, 1/2 IN X 4 IN (12 MM X 100 MM), 6 STRIPS/ENVELOPE: Brand: 3M™ STERI-STRIP™

## (undated) DEVICE — UNDYED BRAIDED (POLYGLACTIN 910), SYNTHETIC ABSORBABLE SUTURE: Brand: COATED VICRYL

## (undated) DEVICE — SOL  .9 3000ML

## (undated) DEVICE — ANTERIOR HIP: Brand: MEDLINE INDUSTRIES, INC.

## (undated) DEVICE — SUTURE MONOCRYL 3-0 Y936H

## (undated) DEVICE — CHLORAPREP 26ML APPLICATOR

## (undated) DEVICE — SYRINGE MNJCT 35ML LF STRL LL

## (undated) DEVICE — SPINOCAN® 18 GA. X 3-1/2 IN. (90 MM) SPINAL NEEDLE: Brand: SPINOCAN®

## (undated) DEVICE — 1010 S-DRAPE TOWEL DRAPE 10/BX: Brand: STERI-DRAPE™

## (undated) DEVICE — DRAPE SHEET LG

## (undated) DEVICE — SUTURE VICRYL 2-0 FS-1

## (undated) DEVICE — DRAPE PACK CHEST & U BAR

## (undated) NOTE — LETTER
[facility]  [address] [place] [address]       24        Patient: Dylan James   YOB: 1950   Date of Visit: 2024       Dear  Dr. Swati Ordoñez,      Thank you for referring Dylan James to my practice.  Please find my assessment and plan below.      ABCD3I SCORE   Risk factors    Age Age greater than or equal to 60: 1    Blood pressure Blood pressure greater than or 140/90 mmH   Clinical features No speech disturbance or weakness: 0   Duration Greater than equal to 60 minutes: 2   Presence of diabetes mellitus  Diabetes present: 1   second TIA in < 7 days   No:0   Imaging    Ipsilateral greater than or equal to 50% stenosis of ICA and/or cerebral major artery Ipsilateral greater than equal to 50% stenosis of internal carotid artery and/or cerebral major artery - No: 0   Acute DWI hyperintensity Imaging: acute MRI-DWI hyperintensity- no: 0   Total    0-3: Low risk  4-7 moderate risk  8-13 high risk 5         73 year old male w/ a pmhx sig. for HTN, HLD, DM, and remote history of smoking who presents for approximately 90 minutes of a MONOCULAR left superior quadrantanopsia, and reportedly dizziness.  The patient did not endorse dizziness.  He reported having blurred vision in his left superior quadrant.  The visual abnormality was not homonymous.  He states that he closed one eye and the other and there was no vision abnormalities in his right eye.    His symptoms were localized to his right inferior optic radiation in his temporal lobe.  However, he denies having homonymous symptoms.  This would localize to his retina.  It is possible that he had a branch retinal artery occlusion.  I do not see any boxcarring on his funduscopic exam.  He does have attenuation.  He needs to see ophthalmology as soon as possible.  He needs his TTE in the next 48 hours.  Will call Dr. Madden at [facility] to see if they can get him in earlier.  He does not have obvious  signs of intracranial atherosclerosis.  He may not need to be on dual antiplatelets.  He should remain on Plavix in addition to aspirin for no longer than 21 days.    90 minutes of a monocular left superior quadrant defect  Differential Diagnosis:  Left branch retinal artery occlusion  Right temporal lobe TIA; only possible if his symptoms were homonymous                Sincerely,   Dimitri Brothers DO   [facility]  [address] [address]    Document electronically generated by:  Dimitri Brothers DO

## (undated) NOTE — MR AVS SNAPSHOT
[address] [address]  [phone]               Thank you for choosing us for your health care visit with Noel Chen MD.  We are glad to serve you and happy to provide you with this olmedo Today's Vital Signs     BP Pulse Height Weight BMI    156/82 mmHg 87 5' 8\" (1.727 m) 244 lb (110.678 kg) 37.11 kg/m2         Current Medications          This list is accurate as of: 6/12/17 12:11 PM.  Always use your most recent med list. view more details from this visit by going to [email or web address]. If you've recently had a stay at the Hospital you can access your discharge instructions in [employer] by going to Visits < Admission Summaries.  If you've been to the Emergency Depar

## (undated) NOTE — Clinical Note
June 13, 2017         Tania Julian MD  [address] [address]      Patient: Liana Noriega   YOB: 1950   Date of Visit: 6/12/2017       Dear Dr. Angie Cota MD,    I saw your patient, Liana Noriega, on 6 Hsi hips and knees are feeling good. They have been better in the last 6 months. He has a little stiffness. His left shoulder is still a little sore from time to time. Most of his stiffness is in bed. His back.    He doesn't feel his hands have gotten joshua He cna't think fo any aggravation to his back. He hasn't played golf for 6 weeks. He does light gardening. He does work out and doesn't think he hurt it with that. The crohn's is fine. No change with that. He has no change in his stools.    He has no th 1. cont. Sulfasalazine 500mg twice a day   2. Check labs in 6 months   3. Return to clinic in 6 months -   Add priobiotic -     2. Hx of open heart sx.  Nidia Ross MD  6/12/2017   11:52 AM